# Patient Record
Sex: MALE | Race: OTHER | HISPANIC OR LATINO | URBAN - METROPOLITAN AREA
[De-identification: names, ages, dates, MRNs, and addresses within clinical notes are randomized per-mention and may not be internally consistent; named-entity substitution may affect disease eponyms.]

---

## 2023-10-08 ENCOUNTER — INPATIENT (INPATIENT)
Facility: HOSPITAL | Age: 53
LOS: 6 days | Discharge: HOME HEALTH | DRG: 853 | End: 2023-10-15
Attending: SURGERY | Admitting: SURGERY
Payer: COMMERCIAL

## 2023-10-08 ENCOUNTER — TRANSCRIPTION ENCOUNTER (OUTPATIENT)
Age: 53
End: 2023-10-08

## 2023-10-08 VITALS
WEIGHT: 158.07 LBS | HEIGHT: 65 IN | SYSTOLIC BLOOD PRESSURE: 130 MMHG | DIASTOLIC BLOOD PRESSURE: 91 MMHG | TEMPERATURE: 103 F | OXYGEN SATURATION: 96 % | RESPIRATION RATE: 22 BRPM | HEART RATE: 158 BPM

## 2023-10-08 LAB
ALBUMIN SERPL ELPH-MCNC: 3.8 G/DL — SIGNIFICANT CHANGE UP (ref 3.5–5)
ALP SERPL-CCNC: 74 U/L — SIGNIFICANT CHANGE UP (ref 40–120)
ALT FLD-CCNC: 37 U/L DA — SIGNIFICANT CHANGE UP (ref 10–60)
ANION GAP SERPL CALC-SCNC: 8 MMOL/L — SIGNIFICANT CHANGE UP (ref 5–17)
APPEARANCE UR: CLEAR — SIGNIFICANT CHANGE UP
APTT BLD: 37.4 SEC — HIGH (ref 24.5–35.6)
AST SERPL-CCNC: 27 U/L — SIGNIFICANT CHANGE UP (ref 10–40)
BASOPHILS # BLD AUTO: 0.06 K/UL — SIGNIFICANT CHANGE UP (ref 0–0.2)
BASOPHILS NFR BLD AUTO: 0.3 % — SIGNIFICANT CHANGE UP (ref 0–2)
BILIRUB SERPL-MCNC: 5.5 MG/DL — HIGH (ref 0.2–1.2)
BILIRUB UR-MCNC: NEGATIVE — SIGNIFICANT CHANGE UP
BUN SERPL-MCNC: 17 MG/DL — SIGNIFICANT CHANGE UP (ref 7–18)
CALCIUM SERPL-MCNC: 8.8 MG/DL — SIGNIFICANT CHANGE UP (ref 8.4–10.5)
CHLORIDE SERPL-SCNC: 92 MMOL/L — LOW (ref 96–108)
CO2 SERPL-SCNC: 26 MMOL/L — SIGNIFICANT CHANGE UP (ref 22–31)
COLOR SPEC: YELLOW — SIGNIFICANT CHANGE UP
CREAT SERPL-MCNC: 1.19 MG/DL — SIGNIFICANT CHANGE UP (ref 0.5–1.3)
DIFF PNL FLD: ABNORMAL
EGFR: 73 ML/MIN/1.73M2 — SIGNIFICANT CHANGE UP
EOSINOPHIL # BLD AUTO: 0.01 K/UL — SIGNIFICANT CHANGE UP (ref 0–0.5)
EOSINOPHIL NFR BLD AUTO: 0.1 % — SIGNIFICANT CHANGE UP (ref 0–6)
GLUCOSE SERPL-MCNC: 159 MG/DL — HIGH (ref 70–99)
GLUCOSE UR QL: NEGATIVE MG/DL — SIGNIFICANT CHANGE UP
HCT VFR BLD CALC: 43.1 % — SIGNIFICANT CHANGE UP (ref 39–50)
HGB BLD-MCNC: 15.2 G/DL — SIGNIFICANT CHANGE UP (ref 13–17)
IMM GRANULOCYTES NFR BLD AUTO: 0.6 % — SIGNIFICANT CHANGE UP (ref 0–0.9)
INR BLD: 1.3 RATIO — HIGH (ref 0.85–1.18)
KETONES UR-MCNC: NEGATIVE MG/DL — SIGNIFICANT CHANGE UP
LACTATE SERPL-SCNC: 3.3 MMOL/L — HIGH (ref 0.7–2)
LEUKOCYTE ESTERASE UR-ACNC: NEGATIVE — SIGNIFICANT CHANGE UP
LYMPHOCYTES # BLD AUTO: 0.6 K/UL — LOW (ref 1–3.3)
LYMPHOCYTES # BLD AUTO: 3.3 % — LOW (ref 13–44)
MCHC RBC-ENTMCNC: 31.1 PG — SIGNIFICANT CHANGE UP (ref 27–34)
MCHC RBC-ENTMCNC: 35.3 GM/DL — SIGNIFICANT CHANGE UP (ref 32–36)
MCV RBC AUTO: 88.1 FL — SIGNIFICANT CHANGE UP (ref 80–100)
MONOCYTES # BLD AUTO: 0.88 K/UL — SIGNIFICANT CHANGE UP (ref 0–0.9)
MONOCYTES NFR BLD AUTO: 4.8 % — SIGNIFICANT CHANGE UP (ref 2–14)
NEUTROPHILS # BLD AUTO: 16.53 K/UL — HIGH (ref 1.8–7.4)
NEUTROPHILS NFR BLD AUTO: 90.9 % — HIGH (ref 43–77)
NITRITE UR-MCNC: NEGATIVE — SIGNIFICANT CHANGE UP
NRBC # BLD: 0 /100 WBCS — SIGNIFICANT CHANGE UP (ref 0–0)
PH UR: 6.5 — SIGNIFICANT CHANGE UP (ref 5–8)
PLATELET # BLD AUTO: 201 K/UL — SIGNIFICANT CHANGE UP (ref 150–400)
POTASSIUM SERPL-MCNC: 4.5 MMOL/L — SIGNIFICANT CHANGE UP (ref 3.5–5.3)
POTASSIUM SERPL-SCNC: 4.5 MMOL/L — SIGNIFICANT CHANGE UP (ref 3.5–5.3)
PROT SERPL-MCNC: 7.9 G/DL — SIGNIFICANT CHANGE UP (ref 6–8.3)
PROT UR-MCNC: 30 MG/DL
PROTHROM AB SERPL-ACNC: 14.7 SEC — HIGH (ref 9.5–13)
RBC # BLD: 4.89 M/UL — SIGNIFICANT CHANGE UP (ref 4.2–5.8)
RBC # FLD: 12.9 % — SIGNIFICANT CHANGE UP (ref 10.3–14.5)
SARS-COV-2 RNA SPEC QL NAA+PROBE: SIGNIFICANT CHANGE UP
SODIUM SERPL-SCNC: 126 MMOL/L — LOW (ref 135–145)
SP GR SPEC: 1.02 — SIGNIFICANT CHANGE UP (ref 1–1.03)
TROPONIN I, HIGH SENSITIVITY RESULT: 19.5 NG/L — SIGNIFICANT CHANGE UP
UROBILINOGEN FLD QL: 0.2 MG/DL — SIGNIFICANT CHANGE UP (ref 0.2–1)
WBC # BLD: 18.18 K/UL — HIGH (ref 3.8–10.5)
WBC # FLD AUTO: 18.18 K/UL — HIGH (ref 3.8–10.5)

## 2023-10-08 PROCEDURE — 74177 CT ABD & PELVIS W/CONTRAST: CPT | Mod: 26,MA

## 2023-10-08 PROCEDURE — 99291 CRITICAL CARE FIRST HOUR: CPT

## 2023-10-08 PROCEDURE — 71045 X-RAY EXAM CHEST 1 VIEW: CPT | Mod: 26

## 2023-10-08 RX ORDER — SODIUM CHLORIDE 9 MG/ML
2200 INJECTION INTRAMUSCULAR; INTRAVENOUS; SUBCUTANEOUS ONCE
Refills: 0 | Status: COMPLETED | OUTPATIENT
Start: 2023-10-08 | End: 2023-10-08

## 2023-10-08 RX ORDER — MORPHINE SULFATE 50 MG/1
4 CAPSULE, EXTENDED RELEASE ORAL ONCE
Refills: 0 | Status: DISCONTINUED | OUTPATIENT
Start: 2023-10-08 | End: 2023-10-08

## 2023-10-08 RX ORDER — PIPERACILLIN AND TAZOBACTAM 4; .5 G/20ML; G/20ML
3.38 INJECTION, POWDER, LYOPHILIZED, FOR SOLUTION INTRAVENOUS ONCE
Refills: 0 | Status: COMPLETED | OUTPATIENT
Start: 2023-10-09 | End: 2023-10-09

## 2023-10-08 RX ORDER — PIPERACILLIN AND TAZOBACTAM 4; .5 G/20ML; G/20ML
3.38 INJECTION, POWDER, LYOPHILIZED, FOR SOLUTION INTRAVENOUS ONCE
Refills: 0 | Status: COMPLETED | OUTPATIENT
Start: 2023-10-08 | End: 2023-10-08

## 2023-10-08 RX ORDER — KETOROLAC TROMETHAMINE 30 MG/ML
15 SYRINGE (ML) INJECTION ONCE
Refills: 0 | Status: DISCONTINUED | OUTPATIENT
Start: 2023-10-08 | End: 2023-10-08

## 2023-10-08 RX ORDER — ACETAMINOPHEN 500 MG
1000 TABLET ORAL ONCE
Refills: 0 | Status: COMPLETED | OUTPATIENT
Start: 2023-10-08 | End: 2023-10-08

## 2023-10-08 RX ADMIN — SODIUM CHLORIDE 2200 MILLILITER(S): 9 INJECTION INTRAMUSCULAR; INTRAVENOUS; SUBCUTANEOUS at 21:27

## 2023-10-08 RX ADMIN — PIPERACILLIN AND TAZOBACTAM 200 GRAM(S): 4; .5 INJECTION, POWDER, LYOPHILIZED, FOR SOLUTION INTRAVENOUS at 21:27

## 2023-10-08 RX ADMIN — Medication 400 MILLIGRAM(S): at 21:27

## 2023-10-08 NOTE — ED ADULT NURSE NOTE - OBJECTIVE STATEMENT
Pt c/o abdominal pain, constipation, with nausea and vomiting x 2 days. noted with fever. sepsis protocol initiated. on CM with sinus tachycardia

## 2023-10-08 NOTE — ED PROVIDER NOTE - CLINICAL SUMMARY MEDICAL DECISION MAKING FREE TEXT BOX
Patient arrived with fever tachycardia.  Code sepsis called.  Patient received fluids and antibiotics and CT scan pending

## 2023-10-08 NOTE — ED ADULT TRIAGE NOTE - TEMPERATURE IN FAHRENHEIT (DEGREES F)
Discharge instructions given to patient and sister. A period of time was provided for questions and answers. Patient educated regarding prescriptions and where to obtain them. Patient continent of medium BM prior to discharge. Patient assisted into w/c amd propelled to ED entrance. Assisted into private vehicle and left with sister, Carole Cheung. 103

## 2023-10-08 NOTE — ED PROVIDER NOTE - PHYSICAL EXAMINATION
Tachycardic lungs clear abdomen somewhat distended with vague diffuse abdominal tenderness to palpation more prominent on the right side.  Patient is awake alert speaking full sentences.  No peripheral edema

## 2023-10-08 NOTE — ED ADULT TRIAGE NOTE - PAIN: PRESENCE, MLM
"  [facility]  Adult Nutrition  Consult Note    SUMMARY     Recommendations    1. Change diet order to Diabetic 1800 calories, Cardiac.   2. Encourage good diet compliance.   Goals: Pt to meet 85% of EEN/EPN.   Nutrition Goal Status: new  Communication of RD Recs: (POC)    Reason for Assessment    Reason For Assessment: consult(assess dietary needs)  General Information Comments: Pt with good appetite and intake, denies any N/V/C/D. Educated pt on low sodium diet/Stroke Nutrition Therapy. NFPE completed, nourished.   Nutrition Discharge Planning: Pt to d/c on a cardiac/diabetic diet.     Nutrition Risk Screen    Nutrition Risk Screen: no indicators present    Nutrition/Diet History    Spiritual, Cultural Beliefs, Uatsdin Practices, Values that Affect Care: no    Anthropometrics    Temp: 98 °F (36.7 °C)  Height Method: Stated  Height: 5' 7" (170.2 cm)  Height (inches): 67 in  Weight Method: Bed Scale  Weight: (!) 141.7 kg (312 lb 6.3 oz)  Weight (lb): (!) 312.39 lb  Ideal Body Weight (IBW), Male: 148 lb  % Ideal Body Weight, Male (lb): 209.74 lb  BMI (Calculated): 48.7     Lab/Procedures/Meds    Pertinent Labs Reviewed: reviewed  Pertinent Labs Comments: BUN 42H, Crea 8.6H, GFR 6L, Phos 6.8H  Pertinent Medications Reviewed: reviewed  Pertinent Medications Comments: heparin, insulin, statin    Estimated/Assessed Needs    Weight Used For Calorie Calculations: 67.1 kg (148 lb)(IBW)  Energy Calorie Requirements (kcal): 1825 kcal/d  Energy Need Method: [place]  Protein Requirements: 54-67 gm/d  Weight Used For Protein Calculations: 67.1 kg (148 lb)(0.8-1.0 gm/kg)  Fluid Requirements (mL): 1 mL/kcal or per MD  Estimated Fluid Requirement Method: RDA Method  RDA Method (mL): 1825  CHO Requirement: 228 gm/d    Nutrition Prescription Ordered    Current Diet Order: Cardiac, Diabetic    Evaluation of Received Nutrient/Fluid Intake    Tolerance: tolerating  % Intake of Estimated Energy Needs: 50 - 75 " %  % Meal Intake: 50 - 75 %    Nutrition Risk    Level of Risk/Frequency of Follow-up: (f/u 2x/weekly)     Assessment and Plan    Nutrition Problem  obesity    Related to (etiology):   Excessive energy intake    Signs and Symptoms (as evidenced by):   BMI of 48    Interventions:  Nutrition education    Nutrition Diagnosis Status:   New    Monitor and Evaluation    Food and Nutrient Intake: energy intake  Food and Nutrient Adminstration: diet order  Physical Activity and Function: nutrition-related ADLs and IADLs  Anthropometric Measurements: weight, weight change  Biochemical Data, Medical Tests and Procedures: gastrointestinal profile, lipid profile, electrolyte and renal panel, glucose/endocrine profile, inflammatory profile  Nutrition-Focused Physical Findings: overall appearance     Malnutrition Assessment      Subcutaneous Fat Loss (Final Summary): well nourished  Muscle Loss Evaluation (Final Summary): well nourished      Nutrition Follow-Up    RD Follow-up?: Yes     complains of pain/discomfort

## 2023-10-08 NOTE — ED ADULT NURSE NOTE - NSFALLUNIVINTERV_ED_ALL_ED
Bed/Stretcher in lowest position, wheels locked, appropriate side rails in place/Call bell, personal items and telephone in reach/Instruct patient to call for assistance before getting out of bed/chair/stretcher/Non-slip footwear applied when patient is off stretcher/[place] to call system/Physically safe environment - no spills, clutter or unnecessary equipment/Purposeful proactive rounding/Room/bathroom lighting operational, light cord in reach

## 2023-10-08 NOTE — ED ADULT TRIAGE NOTE - CHIEF COMPLAINT QUOTE
Notified the patient and patient's  of the PA denial; however, suggested using GoodRx coupon. Using GoodRx, 90 tablets at [address] would cost $24.15. Patient's  verbalized understanding, but stated that he does not have a smartphone to obtain coupon. Notified patient's  that this nurse will print out the coupon and place it at the  for . Patient's  verbalized understanding and expressed appreciation. Will  coupon tomorrow. GoodRx coupon printed and placed at  for .
PA denied.
PSR: There are 5 pharmacies on file. Please specify which pharmacy.
Patient is asking for 90 day supply of this medication per her insurance, or they will not cover it     zolpidem 10 MG Oral Tab    Last OV 11/2/22   Pharmacy verified
Rx sent on 8/30/2022 for 90 days with 1 refill. [address]. Spoke with Miya Blankenship. Miya Blankenship states that insurance only covers 90 tablets in 365 days. PA required. ([phone])  PA completed electronically. Will emery for follow up.
The pharmacy for this medication is the same one it went to last time.  It will be going to [place] on [address]
Pt c/o abdominal pain, constipation, with nausea and vomiting x 2 days.

## 2023-10-08 NOTE — ED PROVIDER NOTE - OBJECTIVE STATEMENT
Medical problems no prior surgical history presents with abdominal pain and constipation for 2 days denies any vomiting denies any fever.  Sister at bedside noticed that his heart rate was elevated at home today and brought him to the ER.

## 2023-10-09 ENCOUNTER — TRANSCRIPTION ENCOUNTER (OUTPATIENT)
Age: 53
End: 2023-10-09

## 2023-10-09 ENCOUNTER — RESULT REVIEW (OUTPATIENT)
Age: 53
End: 2023-10-09

## 2023-10-09 DIAGNOSIS — K35.80 UNSPECIFIED ACUTE APPENDICITIS: ICD-10-CM

## 2023-10-09 LAB
A1C WITH ESTIMATED AVERAGE GLUCOSE RESULT: 5.7 % — HIGH (ref 4–5.6)
ALBUMIN SERPL ELPH-MCNC: 3.4 G/DL — LOW (ref 3.5–5)
ALP SERPL-CCNC: 71 U/L — SIGNIFICANT CHANGE UP (ref 40–120)
ALT FLD-CCNC: 33 U/L DA — SIGNIFICANT CHANGE UP (ref 10–60)
ANION GAP SERPL CALC-SCNC: 8 MMOL/L — SIGNIFICANT CHANGE UP (ref 5–17)
APTT BLD: 34.9 SEC — SIGNIFICANT CHANGE UP (ref 24.5–35.6)
AST SERPL-CCNC: 20 U/L — SIGNIFICANT CHANGE UP (ref 10–40)
BILIRUB DIRECT SERPL-MCNC: 0.2 MG/DL — SIGNIFICANT CHANGE UP (ref 0–0.3)
BILIRUB SERPL-MCNC: 5.7 MG/DL — HIGH (ref 0.2–1.2)
BUN SERPL-MCNC: 13 MG/DL — SIGNIFICANT CHANGE UP (ref 7–18)
CALCIUM SERPL-MCNC: 8.3 MG/DL — LOW (ref 8.4–10.5)
CHLORIDE SERPL-SCNC: 101 MMOL/L — SIGNIFICANT CHANGE UP (ref 96–108)
CO2 SERPL-SCNC: 22 MMOL/L — SIGNIFICANT CHANGE UP (ref 22–31)
CREAT SERPL-MCNC: 0.91 MG/DL — SIGNIFICANT CHANGE UP (ref 0.5–1.3)
CULTURE RESULTS: NO GROWTH — SIGNIFICANT CHANGE UP
EGFR: 101 ML/MIN/1.73M2 — SIGNIFICANT CHANGE UP
ESTIMATED AVERAGE GLUCOSE: 117 MG/DL — HIGH (ref 68–114)
GLUCOSE BLDC GLUCOMTR-MCNC: 115 MG/DL — HIGH (ref 70–99)
GLUCOSE BLDC GLUCOMTR-MCNC: 151 MG/DL — HIGH (ref 70–99)
GLUCOSE BLDC GLUCOMTR-MCNC: 154 MG/DL — HIGH (ref 70–99)
GLUCOSE SERPL-MCNC: 142 MG/DL — HIGH (ref 70–99)
INR BLD: 1.31 RATIO — HIGH (ref 0.85–1.18)
LACTATE SERPL-SCNC: 1.1 MMOL/L — SIGNIFICANT CHANGE UP (ref 0.7–2)
LACTATE SERPL-SCNC: 1.3 MMOL/L — SIGNIFICANT CHANGE UP (ref 0.7–2)
MAGNESIUM SERPL-MCNC: 1.7 MG/DL — SIGNIFICANT CHANGE UP (ref 1.6–2.6)
PHOSPHATE SERPL-MCNC: 2.1 MG/DL — LOW (ref 2.5–4.5)
POTASSIUM SERPL-MCNC: 3.4 MMOL/L — LOW (ref 3.5–5.3)
POTASSIUM SERPL-SCNC: 3.4 MMOL/L — LOW (ref 3.5–5.3)
PROT SERPL-MCNC: 7.1 G/DL — SIGNIFICANT CHANGE UP (ref 6–8.3)
PROTHROM AB SERPL-ACNC: 14.8 SEC — HIGH (ref 9.5–13)
SODIUM SERPL-SCNC: 131 MMOL/L — LOW (ref 135–145)
SPECIMEN SOURCE: SIGNIFICANT CHANGE UP

## 2023-10-09 PROCEDURE — 44970 LAPAROSCOPY APPENDECTOMY: CPT | Mod: AS

## 2023-10-09 PROCEDURE — 88304 TISSUE EXAM BY PATHOLOGIST: CPT | Mod: 26

## 2023-10-09 PROCEDURE — 99222 1ST HOSP IP/OBS MODERATE 55: CPT | Mod: 57,GC

## 2023-10-09 PROCEDURE — 44970 LAPAROSCOPY APPENDECTOMY: CPT

## 2023-10-09 DEVICE — STAPLER COVIDIEN TRI-STAPLE 45MM PURPLE RELOAD: Type: IMPLANTABLE DEVICE | Status: FUNCTIONAL

## 2023-10-09 RX ORDER — HYDROMORPHONE HYDROCHLORIDE 2 MG/ML
0.5 INJECTION INTRAMUSCULAR; INTRAVENOUS; SUBCUTANEOUS
Refills: 0 | Status: DISCONTINUED | OUTPATIENT
Start: 2023-10-09 | End: 2023-10-11

## 2023-10-09 RX ORDER — SODIUM CHLORIDE 9 MG/ML
1000 INJECTION, SOLUTION INTRAVENOUS
Refills: 0 | Status: DISCONTINUED | OUTPATIENT
Start: 2023-10-09 | End: 2023-10-15

## 2023-10-09 RX ORDER — KETOROLAC TROMETHAMINE 30 MG/ML
15 SYRINGE (ML) INJECTION EVERY 6 HOURS
Refills: 0 | Status: DISCONTINUED | OUTPATIENT
Start: 2023-10-09 | End: 2023-10-10

## 2023-10-09 RX ORDER — FENTANYL CITRATE 50 UG/ML
25 INJECTION INTRAVENOUS
Refills: 0 | Status: DISCONTINUED | OUTPATIENT
Start: 2023-10-09 | End: 2023-10-09

## 2023-10-09 RX ORDER — INSULIN LISPRO 100/ML
VIAL (ML) SUBCUTANEOUS EVERY 6 HOURS
Refills: 0 | Status: DISCONTINUED | OUTPATIENT
Start: 2023-10-09 | End: 2023-10-13

## 2023-10-09 RX ORDER — CEFEPIME 1 G/1
1000 INJECTION, POWDER, FOR SOLUTION INTRAMUSCULAR; INTRAVENOUS EVERY 8 HOURS
Refills: 0 | Status: DISCONTINUED | OUTPATIENT
Start: 2023-10-09 | End: 2023-10-15

## 2023-10-09 RX ORDER — ACETAMINOPHEN 500 MG
1000 TABLET ORAL ONCE
Refills: 0 | Status: COMPLETED | OUTPATIENT
Start: 2023-10-09 | End: 2023-10-09

## 2023-10-09 RX ORDER — ONDANSETRON 8 MG/1
4 TABLET, FILM COATED ORAL ONCE
Refills: 0 | Status: DISCONTINUED | OUTPATIENT
Start: 2023-10-09 | End: 2023-10-09

## 2023-10-09 RX ORDER — FENTANYL CITRATE 50 UG/ML
50 INJECTION INTRAVENOUS
Refills: 0 | Status: DISCONTINUED | OUTPATIENT
Start: 2023-10-09 | End: 2023-10-09

## 2023-10-09 RX ORDER — GLUCAGON INJECTION, SOLUTION 0.5 MG/.1ML
1 INJECTION, SOLUTION SUBCUTANEOUS ONCE
Refills: 0 | Status: DISCONTINUED | OUTPATIENT
Start: 2023-10-09 | End: 2023-10-15

## 2023-10-09 RX ORDER — DEXTROSE 50 % IN WATER 50 %
25 SYRINGE (ML) INTRAVENOUS ONCE
Refills: 0 | Status: DISCONTINUED | OUTPATIENT
Start: 2023-10-09 | End: 2023-10-15

## 2023-10-09 RX ORDER — SODIUM CHLORIDE 9 MG/ML
1000 INJECTION INTRAMUSCULAR; INTRAVENOUS; SUBCUTANEOUS
Refills: 0 | Status: DISCONTINUED | OUTPATIENT
Start: 2023-10-09 | End: 2023-10-15

## 2023-10-09 RX ORDER — DEXTROSE 50 % IN WATER 50 %
12.5 SYRINGE (ML) INTRAVENOUS ONCE
Refills: 0 | Status: DISCONTINUED | OUTPATIENT
Start: 2023-10-09 | End: 2023-10-15

## 2023-10-09 RX ORDER — DEXTROSE 50 % IN WATER 50 %
15 SYRINGE (ML) INTRAVENOUS ONCE
Refills: 0 | Status: DISCONTINUED | OUTPATIENT
Start: 2023-10-09 | End: 2023-10-15

## 2023-10-09 RX ORDER — SODIUM CHLORIDE 9 MG/ML
1000 INJECTION INTRAMUSCULAR; INTRAVENOUS; SUBCUTANEOUS ONCE
Refills: 0 | Status: COMPLETED | OUTPATIENT
Start: 2023-10-09 | End: 2023-10-09

## 2023-10-09 RX ORDER — PIPERACILLIN AND TAZOBACTAM 4; .5 G/20ML; G/20ML
3.38 INJECTION, POWDER, LYOPHILIZED, FOR SOLUTION INTRAVENOUS EVERY 8 HOURS
Refills: 0 | Status: DISCONTINUED | OUTPATIENT
Start: 2023-10-09 | End: 2023-10-09

## 2023-10-09 RX ORDER — METRONIDAZOLE 500 MG
500 TABLET ORAL EVERY 8 HOURS
Refills: 0 | Status: DISCONTINUED | OUTPATIENT
Start: 2023-10-09 | End: 2023-10-15

## 2023-10-09 RX ORDER — CEFEPIME 1 G/1
INJECTION, POWDER, FOR SOLUTION INTRAMUSCULAR; INTRAVENOUS
Refills: 0 | Status: DISCONTINUED | OUTPATIENT
Start: 2023-10-09 | End: 2023-10-15

## 2023-10-09 RX ORDER — CEFEPIME 1 G/1
1000 INJECTION, POWDER, FOR SOLUTION INTRAMUSCULAR; INTRAVENOUS ONCE
Refills: 0 | Status: COMPLETED | OUTPATIENT
Start: 2023-10-09 | End: 2023-10-09

## 2023-10-09 RX ADMIN — Medication 100 MILLIGRAM(S): at 21:41

## 2023-10-09 RX ADMIN — Medication 15 MILLIGRAM(S): at 12:00

## 2023-10-09 RX ADMIN — SODIUM CHLORIDE 125 MILLILITER(S): 9 INJECTION INTRAMUSCULAR; INTRAVENOUS; SUBCUTANEOUS at 21:40

## 2023-10-09 RX ADMIN — Medication 15 MILLIGRAM(S): at 22:09

## 2023-10-09 RX ADMIN — Medication 1: at 07:02

## 2023-10-09 RX ADMIN — SODIUM CHLORIDE 125 MILLILITER(S): 9 INJECTION INTRAMUSCULAR; INTRAVENOUS; SUBCUTANEOUS at 06:56

## 2023-10-09 RX ADMIN — CEFEPIME 100 MILLIGRAM(S): 1 INJECTION, POWDER, FOR SOLUTION INTRAMUSCULAR; INTRAVENOUS at 06:56

## 2023-10-09 RX ADMIN — Medication 1000 MILLIGRAM(S): at 00:55

## 2023-10-09 RX ADMIN — Medication 400 MILLIGRAM(S): at 02:54

## 2023-10-09 RX ADMIN — SODIUM CHLORIDE 1000 MILLILITER(S): 9 INJECTION INTRAMUSCULAR; INTRAVENOUS; SUBCUTANEOUS at 02:01

## 2023-10-09 RX ADMIN — Medication 100 MILLIGRAM(S): at 17:32

## 2023-10-09 RX ADMIN — Medication 15 MILLIGRAM(S): at 01:35

## 2023-10-09 RX ADMIN — Medication 15 MILLIGRAM(S): at 12:29

## 2023-10-09 RX ADMIN — Medication 100 MILLIGRAM(S): at 06:57

## 2023-10-09 RX ADMIN — CEFEPIME 100 MILLIGRAM(S): 1 INJECTION, POWDER, FOR SOLUTION INTRAMUSCULAR; INTRAVENOUS at 21:41

## 2023-10-09 RX ADMIN — Medication 15 MILLIGRAM(S): at 21:54

## 2023-10-09 RX ADMIN — CEFEPIME 100 MILLIGRAM(S): 1 INJECTION, POWDER, FOR SOLUTION INTRAMUSCULAR; INTRAVENOUS at 14:42

## 2023-10-09 NOTE — PATIENT PROFILE ADULT - FALL HARM RISK - HARM RISK INTERVENTIONS

## 2023-10-09 NOTE — H&P ADULT - ASSESSMENT
Mr. James is a 53 year old man with no known PMHx (doesnt follow with a PCP) presenting with sepsis secondary to acute nonperforated appendicitis.    Plan:  - admit to surgery, Dr. Aguilar  - add on for laparoscopic appendectomy  - NPO/IVF resuscitation  - follow up fractioned bilirubin  - continue zosyn  - mechanical VTE ppx    discussed with Lauren Russo

## 2023-10-09 NOTE — CONSULT NOTE ADULT - SUBJECTIVE AND OBJECTIVE BOX
HPI:  Mr. James is a 53 year old man with no known PMHx (doesnt follow with a PCP) presenting with one day of pain. Pain onset happened suddenly and worsened throughout the day. He reports associated nausea, vomiting, fever, constipation, and decreased frequency of urination. Denies yellowing of eyes or skin, pale colored stool, or dark urine. Has never had a colonoscopy.     In the ED patient febrile to 101.9, tachycardic to 130s, and normotensive. Labs notable for leukocytosis to 18, hyponatremia to 126. TBili is elevated to 5.5 with normal ALT/AST. Lactate 3.3. Patient given 2.2L bolus NS and started on Zosyn.   (09 Oct 2023 00:18)      PAST MEDICAL & SURGICAL HISTORY:  No pertinent past medical history      No significant past surgical history          No Known Allergies      Meds:  cefepime   IVPB      cefepime   IVPB 1000 milliGRAM(s) IV Intermittent every 8 hours  dextrose 5%. 1000 milliLiter(s) IV Continuous <Continuous>  dextrose 5%. 1000 milliLiter(s) IV Continuous <Continuous>  dextrose 50% Injectable 25 Gram(s) IV Push once  dextrose 50% Injectable 12.5 Gram(s) IV Push once  dextrose 50% Injectable 25 Gram(s) IV Push once  dextrose Oral Gel 15 Gram(s) Oral once PRN  glucagon  Injectable 1 milliGRAM(s) IntraMuscular once  HYDROmorphone  Injectable 0.5 milliGRAM(s) IV Push every 3 hours PRN  insulin lispro (ADMELOG) corrective regimen sliding scale   SubCutaneous every 6 hours  ketorolac   Injectable 15 milliGRAM(s) IV Push every 6 hours PRN  metroNIDAZOLE  IVPB 500 milliGRAM(s) IV Intermittent every 8 hours  sodium chloride 0.9%. 1000 milliLiter(s) IV Continuous <Continuous>      SOCIAL HISTORY:  Smoker:  YES / NO        PACK YEARS:                         WHEN QUIT?  ETOH use:  YES / NO               FREQUENCY / QUANTITY:  Ilicit Drug use:  YES / NO  Occupation:  Assisted device use (Cane / Walker):  Live with:    FAMILY HISTORY:  No pertinent family history in first degree relatives        VITALS:  Vital Signs Last 24 Hrs  T(C): 36.8 (09 Oct 2023 14:19), Max: 39.4 (08 Oct 2023 20:56)  T(F): 98.3 (09 Oct 2023 14:19), Max: 103 (08 Oct 2023 20:56)  HR: 98 (09 Oct 2023 14:19) (98 - 158)  BP: 99/66 (09 Oct 2023 14:19) (99/66 - 136/85)  BP(mean): 82 (09 Oct 2023 06:54) (82 - 101)  RR: 18 (09 Oct 2023 14:19) (18 - 24)  SpO2: 98% (09 Oct 2023 14:19) (93% - 100%)    Parameters below as of 09 Oct 2023 14:19  Patient On (Oxygen Delivery Method): room air        LABS/DIAGNOSTIC TESTS:                          15.2   18.18 )-----------( 201      ( 08 Oct 2023 21:10 )             43.1     WBC Count: 18.18 K/uL (10-08 @ 21:10)      10-09    131<L>  |  101  |  13  ----------------------------<  142<H>  3.4<L>   |  22  |  0.91    Ca    8.3<L>      09 Oct 2023 01:55  Phos  2.1     10-09  Mg     1.7     10-09    TPro  7.1  /  Alb  3.4<L>  /  TBili  5.7<H>  /  DBili  x   /  AST  20  /  ALT  33  /  AlkPhos  71  10-09      Urine Microscopic-Add On (NC) (10.08.23 @ 22:50)   Squamous Epithelial Cells: None Seen  Bacteria: Occasional /HPF  Red Blood Cell - Urine: 2 /HPF  White Blood Cell - Urine: 0 /HPFUrinalysis (10.08.23 @ 22:50)   pH Urine: 6.5  Glucose Qualitative, Urine: Negative mg/dL  Blood, Urine: Moderate  Color: Yellow  Urine Appearance: Clear  Bilirubin: Negative  Ketone - Urine: Negative mg/dL  Specific Gravity: 1.018  Protein, Urine: 30 mg/dL  Urobilinogen: 0.2 mg/dL  Nitrite: Negative  Leukocyte Esterase Concentration: Negative      LIVER FUNCTIONS - ( 09 Oct 2023 01:55 )  Alb: 3.4 g/dL / Pro: 7.1 g/dL / ALK PHOS: 71 U/L / ALT: 33 U/L DA / AST: 20 U/L / GGT: x             PT/INR - ( 09 Oct 2023 01:55 )   PT: 14.8 sec;   INR: 1.31 ratio         PTT - ( 09 Oct 2023 01:55 )  PTT:34.9 sec    LACTATE:Lactate, Blood: 1.3 mmol/L (10-09 @ 07:10)  Lactate, Blood: 1.1 mmol/L (10-09 @ 01:55)  Lactate, Blood: 3.3 mmol/L (10-08 @ 21:10)      ABG -     CULTURES:       RADIOLOGY:< from: CT Abdomen and Pelvis w/ IV Cont (10.08.23 @ 22:26) >  ACC: [id number] EXAM:  CT ABDOMEN AND PELVIS IC   ORDERED BY: ANGIE HARTMANN     PROCEDURE DATE:  10/08/2023          INTERPRETATION:  CLINICAL INFORMATION: 53 years old. Male. abd pain   constipation.    COMPARISON: None.    CONTRAST/COMPLICATIONS:  IV Contrast: Omnipaque 350  90 cc administered  10 cc discarded.  Oral Contrast: NONE  Complications: None reported at time of study completion    PROCEDURE:  CT of the Abdomen and Pelvis was performed.  Sagittal and coronal reformats wereperformed.    FINDINGS:    LOWER CHEST: Within normal limits.    LIVER: 1.4 cm indeterminate hypodensity in the right hepatic lobe, may be   further evaluated with nonemergent contrast-enhanced MRI.  BILE DUCTS: Normal caliber.  GALLBLADDER: Within normal limits.  SPLEEN: Within normal limits.  PANCREAS: Within normal limits.  ADRENALS: Within normal limits.  KIDNEYS/URETERS: Enhance symmetrically. No hydronephrosis.    BLADDER: Within normal limits.  REPRODUCTIVE ORGANS: Prostate is not enlarged.    BOWEL: No bowel obstruction. There are a few mildly prominent small bowel   loops, probably due to reactive ileus. Appendix is thickened, measuring   up to 1.4 cm, with surrounding fat stranding. No free air or evidence of   abscess. Small appendicolith at the appendiceal orifice.  PERITONEUM: No ascites.  VESSELS: Normal caliber abdominal aorta.  RETROPERITONEUM/LYMPH NODES: No lymphadenopathy.  ABDOMINAL WALL: Within normal limits.  BONES: With normal limits.    IMPRESSION:    Acute appendicitis. No free air or evidence of abscess.    --- End of Report ---            ZAID PENDLETON MD; Attending Radiologist  This document has been electronically signed. Oct  8 2023 11:18PM    < end of copied text >  ---------------------------------------------------------------------------------------------------------------------------------------------    ACC: [id number] EXAM:  XR CHEST PORTABLE IMMED 1V   ORDERED BY: ANGIE HARTMANN     PROCEDURE DATE:  10/08/2023          INTERPRETATION:  TIME OF EXAM: October 8, 2023 at 9:14 PM.    CLINICAL INFORMATION: Sepsis.    COMPARISON:  None available    TECHNIQUE:   AP Portable chest x-ray.    INTERPRETATION:    The heart is not enlarged.  The mediastinum and amelia are unremarkable.  There are low lung volumes with a mildly elevated right hemidiaphragm.  The lungs are clear.  No pleural effusion or pneumothorax is seen.  No acute bony abnormality is noted.      IMPRESSION:  Low lung volumes with mildly elevated right hemidiaphragm.    Clear lungs.    --- End of Report ---            SANYA GONZALEZ MD; Attending Radiologist  This document has been electronically signed. Oct  9 2023  8:22AM    < end of copied text >        ROS  [  ] UNABLE TO ELICIT               HPI:  Mr. James is a 53 year old man with no known PMHx (doesnt follow with a PCP) presenting with one day of pain. Pain onset happened suddenly and worsened throughout the day. He reports associated nausea, vomiting, fever, constipation, and decreased frequency of urination. Denies yellowing of eyes or skin, pale colored stool, or dark urine. Has never had a colonoscopy.     In the ED patient febrile to 101.9, tachycardic to 130s, and normotensive. Labs notable for leukocytosis to 18, hyponatremia to 126. TBili is elevated to 5.5 with normal ALT/AST. Lactate 3.3. Patient given 2.2L bolus NS and started on Zosyn.   (09 Oct 2023 00:18)        History as above, asked to see this patient who has no significant PMH and presented with severe abdominal pain along with nausea , vomiting and feeling feverish x 1 day. He was found to have acute Appendicitis on CT abdomen and was taken to the OR and found to have acute Gangrenous Appendicitis with perforation and focal peritonitis. He is S/P Appendectomy and peritoneal washout today. He is doing much better and currently is not in much pain as the anesthesia he got is probably subduing his pain. He was found to have a Temp as high as 103 here and an elevated WBC count along with tachycardia and so had Sepsis on admission. His wife and daughter are at the bedside.          PAST MEDICAL & SURGICAL HISTORY:  No pertinent past medical history      No significant past surgical history          No Known Allergies      Meds:  cefepime   IVPB      cefepime   IVPB 1000 milliGRAM(s) IV Intermittent every 8 hours  dextrose 5%. 1000 milliLiter(s) IV Continuous <Continuous>  dextrose 5%. 1000 milliLiter(s) IV Continuous <Continuous>  dextrose 50% Injectable 25 Gram(s) IV Push once  dextrose 50% Injectable 12.5 Gram(s) IV Push once  dextrose 50% Injectable 25 Gram(s) IV Push once  dextrose Oral Gel 15 Gram(s) Oral once PRN  glucagon  Injectable 1 milliGRAM(s) IntraMuscular once  HYDROmorphone  Injectable 0.5 milliGRAM(s) IV Push every 3 hours PRN  insulin lispro (ADMELOG) corrective regimen sliding scale   SubCutaneous every 6 hours  ketorolac   Injectable 15 milliGRAM(s) IV Push every 6 hours PRN  metroNIDAZOLE  IVPB 500 milliGRAM(s) IV Intermittent every 8 hours  sodium chloride 0.9%. 1000 milliLiter(s) IV Continuous <Continuous>      SOCIAL HISTORY:  Smoker:  no  ETOH use:  socially  Ilicit Drug use:  no      FAMILY HISTORY:  No pertinent family history in first degree relatives        VITALS:  Vital Signs Last 24 Hrs  T(C): 36.8 (09 Oct 2023 14:19), Max: 39.4 (08 Oct 2023 20:56)  T(F): 98.3 (09 Oct 2023 14:19), Max: 103 (08 Oct 2023 20:56)  HR: 98 (09 Oct 2023 14:19) (98 - 158)  BP: 99/66 (09 Oct 2023 14:19) (99/66 - 136/85)  BP(mean): 82 (09 Oct 2023 06:54) (82 - 101)  RR: 18 (09 Oct 2023 14:19) (18 - 24)  SpO2: 98% (09 Oct 2023 14:19) (93% - 100%)    Parameters below as of 09 Oct 2023 14:19  Patient On (Oxygen Delivery Method): room air        LABS/DIAGNOSTIC TESTS:                          15.2   18.18 )-----------( 201      ( 08 Oct 2023 21:10 )             43.1     WBC Count: 18.18 K/uL (10-08 @ 21:10)      10-09    131<L>  |  101  |  13  ----------------------------<  142<H>  3.4<L>   |  22  |  0.91    Ca    8.3<L>      09 Oct 2023 01:55  Phos  2.1     10-09  Mg     1.7     10-09    TPro  7.1  /  Alb  3.4<L>  /  TBili  5.7<H>  /  DBili  x   /  AST  20  /  ALT  33  /  AlkPhos  71  10-09      Urine Microscopic-Add On (NC) (10.08.23 @ 22:50)   Squamous Epithelial Cells: None Seen  Bacteria: Occasional /HPF  Red Blood Cell - Urine: 2 /HPF  White Blood Cell - Urine: 0 /HPFUrinalysis (10.08.23 @ 22:50)   pH Urine: 6.5  Glucose Qualitative, Urine: Negative mg/dL  Blood, Urine: Moderate  Color: Yellow  Urine Appearance: Clear  Bilirubin: Negative  Ketone - Urine: Negative mg/dL  Specific Gravity: 1.018  Protein, Urine: 30 mg/dL  Urobilinogen: 0.2 mg/dL  Nitrite: Negative  Leukocyte Esterase Concentration: Negative      LIVER FUNCTIONS - ( 09 Oct 2023 01:55 )  Alb: 3.4 g/dL / Pro: 7.1 g/dL / ALK PHOS: 71 U/L / ALT: 33 U/L DA / AST: 20 U/L / GGT: x             PT/INR - ( 09 Oct 2023 01:55 )   PT: 14.8 sec;   INR: 1.31 ratio         PTT - ( 09 Oct 2023 01:55 )  PTT:34.9 sec    LACTATE:Lactate, Blood: 1.3 mmol/L (10-09 @ 07:10)  Lactate, Blood: 1.1 mmol/L (10-09 @ 01:55)  Lactate, Blood: 3.3 mmol/L (10-08 @ 21:10)      ABG -     CULTURES:       RADIOLOGY:< from: CT Abdomen and Pelvis w/ IV Cont (10.08.23 @ 22:26) >  ACC: [id number] EXAM:  CT ABDOMEN AND PELVIS IC   ORDERED BY: ANGIE HARTMANN     PROCEDURE DATE:  10/08/2023          INTERPRETATION:  CLINICAL INFORMATION: 53 years old. Male. abd pain   constipation.    COMPARISON: None.    CONTRAST/COMPLICATIONS:  IV Contrast: Omnipaque 350  90 cc administered  10 cc discarded.  Oral Contrast: NONE  Complications: None reported at time of study completion    PROCEDURE:  CT of the Abdomen and Pelvis was performed.  Sagittal and coronal reformats wereperformed.    FINDINGS:    LOWER CHEST: Within normal limits.    LIVER: 1.4 cm indeterminate hypodensity in the right hepatic lobe, may be   further evaluated with nonemergent contrast-enhanced MRI.  BILE DUCTS: Normal caliber.  GALLBLADDER: Within normal limits.  SPLEEN: Within normal limits.  PANCREAS: Within normal limits.  ADRENALS: Within normal limits.  KIDNEYS/URETERS: Enhance symmetrically. No hydronephrosis.    BLADDER: Within normal limits.  REPRODUCTIVE ORGANS: Prostate is not enlarged.    BOWEL: No bowel obstruction. There are a few mildly prominent small bowel   loops, probably due to reactive ileus. Appendix is thickened, measuring   up to 1.4 cm, with surrounding fat stranding. No free air or evidence of   abscess. Small appendicolith at the appendiceal orifice.  PERITONEUM: No ascites.  VESSELS: Normal caliber abdominal aorta.  RETROPERITONEUM/LYMPH NODES: No lymphadenopathy.  ABDOMINAL WALL: Within normal limits.  BONES: With normal limits.    IMPRESSION:    Acute appendicitis. No free air or evidence of abscess.    --- End of Report ---            ZAID PENDLETON MD; Attending Radiologist  This document has been electronically signed. Oct  8 2023 11:18PM    < end of copied text >  ---------------------------------------------------------------------------------------------------------------------------------------------    ACC: [id number] EXAM:  XR CHEST PORTABLE IMMED 1V   ORDERED BY: ANGIE HARTMANN     PROCEDURE DATE:  10/08/2023          INTERPRETATION:  TIME OF EXAM: October 8, 2023 at 9:14 PM.    CLINICAL INFORMATION: Sepsis.    COMPARISON:  None available    TECHNIQUE:   AP Portable chest x-ray.    INTERPRETATION:    The heart is not enlarged.  The mediastinum and amelia are unremarkable.  There are low lung volumes with a mildly elevated right hemidiaphragm.  The lungs are clear.  No pleural effusion or pneumothorax is seen.  No acute bony abnormality is noted.      IMPRESSION:  Low lung volumes with mildly elevated right hemidiaphragm.    Clear lungs.    --- End of Report ---            SANYA GONZALEZ MD; Attending Radiologist  This document has been electronically signed. Oct  9 2023  8:22AM    < end of copied text >        ROS  [  ] UNABLE TO ELICIT

## 2023-10-09 NOTE — PROGRESS NOTE ADULT - ASSESSMENT
Mr. James is a 53 year old man with no known PMHx (doesnt follow with a PCP) presenting with acute nonperforated appendicitis  Afebrile, recovering appropriately postoperatively    Plan:  - NPO/IVF  - continue IV antibiotics   - continue arevalo to gravity   - pain control PRN   - antiemetic PRN  - ISS  - OOB/ambulate as tolerated   - Incentive spirometry  - mechanical VTE ppx

## 2023-10-09 NOTE — CONSULT NOTE ADULT - GASTROINTESTINAL
soft/no guarding/no rigidity/no organomegaly/no masses palpable/tender/distended/bowel sounds hypoactive details…

## 2023-10-09 NOTE — BRIEF OPERATIVE NOTE - NSICDXBRIEFPROCEDURE_GEN_ALL_CORE_FT
PROCEDURES:  Laparoscopic appendectomy 09-Oct-2023 05:49:25 PERFORATED AND GANGRENOUS Rachel Vergara

## 2023-10-09 NOTE — H&P ADULT - NSHPLABSRESULTS_GEN_ALL_CORE
15.2   18.18 )-----------( 201      ( 08 Oct 2023 21:10 )             43.1       10-08    126<L>  |  92<L>  |  17  ----------------------------<  159<H>  4.5   |  26  |  1.19    Ca    8.8      08 Oct 2023 21:10    TPro  7.9  /  Alb  3.8  /  TBili  5.5<H>  /  DBili  x   /  AST  27  /  ALT  37  /  AlkPhos  74  10-08      PT/INR - ( 08 Oct 2023 21:10 )   PT: 14.7 sec;   INR: 1.30 ratio         PTT - ( 08 Oct 2023 21:10 )  PTT:37.4 sec    ABG - ( 08 Oct 2023 21:10 )  pH: x     /  pCO2: x     /  pO2: x     / HCO3: x     / Base Excess: x     /  SaO2: x       Lactate: 3.3        IMAGING STUDIES:  < from: CT Abdomen and Pelvis w/ IV Cont (10.08.23 @ 22:26) >    CONTRAST/COMPLICATIONS:  IV Contrast: Omnipaque 350  90 cc administered  10 cc discarded.  Oral Contrast: NONE  Complications: None reported at time of study completion    PROCEDURE:  CT of the Abdomen and Pelvis was performed.  Sagittal and coronal reformats wereperformed.    FINDINGS:    LOWER CHEST: Within normal limits.    LIVER: 1.4 cm indeterminate hypodensity in the right hepatic lobe, may be   further evaluated with nonemergent contrast-enhanced MRI.  BILE DUCTS: Normal caliber.  GALLBLADDER: Within normal limits.  SPLEEN: Within normal limits.  PANCREAS: Within normal limits.  ADRENALS: Within normal limits.  KIDNEYS/URETERS: Enhance symmetrically. No hydronephrosis.    BLADDER: Within normal limits.  REPRODUCTIVE ORGANS: Prostate is not enlarged.    BOWEL: No bowel obstruction. There are a few mildly prominent small bowel   loops, probably due to reactive ileus. Appendix is thickened, measuring   up to 1.4 cm, with surrounding fat stranding. No free air or evidence of   abscess. Small appendicolith at the appendiceal orifice.  PERITONEUM: No ascites.  VESSELS: Normal caliber abdominal aorta.  RETROPERITONEUM/LYMPH NODES: No lymphadenopathy.  ABDOMINAL WALL: Within normal limits.  BONES: With normal limits.    IMPRESSION:    Acute appendicitis. No free air or evidence of abscess.    < end of copied text >

## 2023-10-09 NOTE — H&P ADULT - NSHPPHYSICALEXAM_GEN_ALL_CORE
VITAL SIGNS:  ICU Vital Signs Last 24 Hrs  T(C): 38.8 (08 Oct 2023 23:36), Max: 39.4 (08 Oct 2023 20:56)  T(F): 101.8 (08 Oct 2023 23:36), Max: 103 (08 Oct 2023 20:56)  HR: 121 (08 Oct 2023 23:36) (120 - 158)  BP: 114/75 (08 Oct 2023 23:36) (114/75 - 130/91)  BP(mean): --  ABP: --  ABP(mean): --  RR: 24 (08 Oct 2023 23:36) (22 - 24)  SpO2: 97% (08 Oct 2023 23:36) (96% - 98%)    O2 Parameters below as of 08 Oct 2023 23:36  Patient On (Oxygen Delivery Method): room air            PHYSICAL EXAMINATION:  General - well-nourished, no acute distress  Neuro - awake, alert, oriented x4  HEENT - normocephalic  Lungs - breathing comfortably on room air  Heart - sinus tachycardia on monitor  Abdomen - softly distended, diffusely tender with focal peritonitis in RLQ  Extremities - all four extremities are warm

## 2023-10-09 NOTE — CONSULT NOTE ADULT - ASSESSMENT
Acute Gangrenous Appendicitis - complicated by perforation  Peritonitis ( focal)  Sepsis  Fevers  Leukocytosis    Plan - Cont Maxipime 1gm iv q8hrs  Cont Flagyl 500mgs iv q8hrs Acute Gangrenous Appendicitis - complicated by perforation  Peritonitis ( focal)  Sepsis  Fevers  Leukocytosis    Plan - Cont Maxipime 1gm iv q8hrs  Cont Flagyl 500mgs iv q8hrs  when stable to go home will switch to PO antibiotics to complete a total of 14 days of all antibiotics.

## 2023-10-09 NOTE — H&P ADULT - HISTORY OF PRESENT ILLNESS
Mr. James is a 53 year old man with no known PMHx (doesnt follow with a PCP) presenting with one day of pain. Pain onset happened suddenly and worsened throughout the day. He reports associated nausea, vomiting, fever, constipation, and decreased frequency of urination. Denies yellowing of eyes or skin, pale colored stool, or dark urine. Has never had a colonoscopy.     In the ED patient febrile to 101.9, tachycardic to 130s, and normotensive. Labs notable for leukocytosis to 18, hyponatremia to 126. TBili is elevated to 5.5 with normal ALT/AST. Lactate 3.3. Patient given 2.2L bolus NS and started on Zosyn.

## 2023-10-09 NOTE — H&P ADULT - ATTENDING COMMENTS
Pt seen and examined in ED. Family, sister at bedside. Agree with above note per Dr. Daniels. Reviewed CT images. On exam pt distended with diffuse tenderness. + guarding no rebound. Pt with signs of sepsis given tachycardia,temp 39.4, WBC 18. To OR for appendedctomy. PARQ discussion held with patient. He expressed understanding and agrees to proceed. Pt seen and examined in ED. Family, sister at bedside. Agree with above note per Dr. Daniels. Reviewed CT images. On exam pt distended with diffuse tenderness. + guarding no rebound. Pt with signs of sepsis given tachycardia,temp 39.4, WBC 18. To OR for appendectomy. PARQ discussion held with patient. He expressed understanding and agrees to proceed.

## 2023-10-09 NOTE — PROGRESS NOTE ADULT - SUBJECTIVE AND OBJECTIVE BOX
INTERVAL HPI/OVERNIGHT EVENTS: NAEO. Afebrile overnight, tachycardic to 110. Patient seen and examined at bedside. Abdominal pain is controlled with current regimen. Denies CP, SOB, nausea, emesis, fevers, chills.     Vital Signs Last 24 Hrs  T(C): 36.9 (09 Oct 2023 06:54), Max: 39.4 (08 Oct 2023 20:56)  T(F): 98.5 (09 Oct 2023 06:54), Max: 103 (08 Oct 2023 20:56)  HR: 110 (09 Oct 2023 06:54) (110 - 158)  BP: 108/70 (09 Oct 2023 06:54) (100/75 - 136/85)  BP(mean): 82 (09 Oct 2023 06:54) (82 - 101)  RR: 18 (09 Oct 2023 06:54) (18 - 24)  SpO2: 93% (09 Oct 2023 06:54) (93% - 100%)    Parameters below as of 09 Oct 2023 06:54  Patient On (Oxygen Delivery Method): room air      I&O's Detail    08 Oct 2023 07:01  -  09 Oct 2023 07:00  --------------------------------------------------------  IN:    Lactated Ringers Bolus: 2000 mL    sodium chloride 0.9%: 250 mL  Total IN: 2250 mL    OUT:    Blood Loss (mL): 50 mL    Bulb (mL): 10 mL    Bulb (mL): 10 mL    Indwelling Catheter - Urethral (mL): 250 mL  Total OUT: 320 mL    Total NET: 1930 mL        cefepime   IVPB 1000 milliGRAM(s) IV Intermittent every 8 hours  cefepime   IVPB      metroNIDAZOLE  IVPB 500 milliGRAM(s) IV Intermittent every 8 hours      Physical Exam:  General: AAOx3, No acute distress  HEENT: NC/AT, trachea midline  Respiratory: Nonlabored breathing, equal chest rise b/l   Skin: No jaundice, no icterus  Abdomen: soft,  nondistended, appropriately ttp. JAVIER x2 with SS drainage. Dressings c/d/i  : Ayala with clear yellow urine  Extremities: non edematous, no calf pain bilaterally  Lines/Drains/Tubes:     Drains/Tubes:     10-08-23 @ 07:01  -  10-09-23 @ 07:00  --------------------------------------------------------  IN: 2250 mL / OUT: 320 mL / NET: 1930 mL        Labs:                        15.2   18.18 )-----------( 201      ( 08 Oct 2023 21:10 )             43.1     10-09    131<L>  |  101  |  13  ----------------------------<  142<H>  3.4<L>   |  22  |  0.91    Ca    8.3<L>      09 Oct 2023 01:55  Phos  2.1     10-09  Mg     1.7     10-09    TPro  7.1  /  Alb  3.4<L>  /  TBili  5.7<H>  /  DBili  x   /  AST  20  /  ALT  33  /  AlkPhos  71  10-09    PT/INR - ( 09 Oct 2023 01:55 )   PT: 14.8 sec;   INR: 1.31 ratio         PTT - ( 09 Oct 2023 01:55 )  PTT:34.9 sec    RADIOLOGY & ADDITIONAL STUDIES:

## 2023-10-10 LAB
ANION GAP SERPL CALC-SCNC: 9 MMOL/L — SIGNIFICANT CHANGE UP (ref 5–17)
BUN SERPL-MCNC: 15 MG/DL — SIGNIFICANT CHANGE UP (ref 7–18)
CALCIUM SERPL-MCNC: 8 MG/DL — LOW (ref 8.4–10.5)
CHLORIDE SERPL-SCNC: 109 MMOL/L — HIGH (ref 96–108)
CO2 SERPL-SCNC: 20 MMOL/L — LOW (ref 22–31)
CREAT SERPL-MCNC: 0.72 MG/DL — SIGNIFICANT CHANGE UP (ref 0.5–1.3)
EGFR: 109 ML/MIN/1.73M2 — SIGNIFICANT CHANGE UP
GLUCOSE BLDC GLUCOMTR-MCNC: 114 MG/DL — HIGH (ref 70–99)
GLUCOSE BLDC GLUCOMTR-MCNC: 117 MG/DL — HIGH (ref 70–99)
GLUCOSE BLDC GLUCOMTR-MCNC: 128 MG/DL — HIGH (ref 70–99)
GLUCOSE BLDC GLUCOMTR-MCNC: 128 MG/DL — HIGH (ref 70–99)
GLUCOSE BLDC GLUCOMTR-MCNC: 131 MG/DL — HIGH (ref 70–99)
GLUCOSE BLDC GLUCOMTR-MCNC: 140 MG/DL — HIGH (ref 70–99)
GLUCOSE BLDC GLUCOMTR-MCNC: 140 MG/DL — HIGH (ref 70–99)
GLUCOSE SERPL-MCNC: 123 MG/DL — HIGH (ref 70–99)
HCT VFR BLD CALC: 33.7 % — LOW (ref 39–50)
HCT VFR BLD CALC: 35.4 % — LOW (ref 39–50)
HGB BLD-MCNC: 11.4 G/DL — LOW (ref 13–17)
HGB BLD-MCNC: 12 G/DL — LOW (ref 13–17)
MCHC RBC-ENTMCNC: 30.2 PG — SIGNIFICANT CHANGE UP (ref 27–34)
MCHC RBC-ENTMCNC: 30.4 PG — SIGNIFICANT CHANGE UP (ref 27–34)
MCHC RBC-ENTMCNC: 33.8 GM/DL — SIGNIFICANT CHANGE UP (ref 32–36)
MCHC RBC-ENTMCNC: 33.9 GM/DL — SIGNIFICANT CHANGE UP (ref 32–36)
MCV RBC AUTO: 89.4 FL — SIGNIFICANT CHANGE UP (ref 80–100)
MCV RBC AUTO: 89.6 FL — SIGNIFICANT CHANGE UP (ref 80–100)
NRBC # BLD: 0 /100 WBCS — SIGNIFICANT CHANGE UP (ref 0–0)
NRBC # BLD: 0 /100 WBCS — SIGNIFICANT CHANGE UP (ref 0–0)
PLATELET # BLD AUTO: 160 K/UL — SIGNIFICANT CHANGE UP (ref 150–400)
PLATELET # BLD AUTO: 163 K/UL — SIGNIFICANT CHANGE UP (ref 150–400)
POTASSIUM SERPL-MCNC: 3.6 MMOL/L — SIGNIFICANT CHANGE UP (ref 3.5–5.3)
POTASSIUM SERPL-SCNC: 3.6 MMOL/L — SIGNIFICANT CHANGE UP (ref 3.5–5.3)
RBC # BLD: 3.77 M/UL — LOW (ref 4.2–5.8)
RBC # BLD: 3.95 M/UL — LOW (ref 4.2–5.8)
RBC # FLD: 13.4 % — SIGNIFICANT CHANGE UP (ref 10.3–14.5)
RBC # FLD: 13.5 % — SIGNIFICANT CHANGE UP (ref 10.3–14.5)
SODIUM SERPL-SCNC: 138 MMOL/L — SIGNIFICANT CHANGE UP (ref 135–145)
WBC # BLD: 12.83 K/UL — HIGH (ref 3.8–10.5)
WBC # BLD: 14.18 K/UL — HIGH (ref 3.8–10.5)
WBC # FLD AUTO: 12.83 K/UL — HIGH (ref 3.8–10.5)
WBC # FLD AUTO: 14.18 K/UL — HIGH (ref 3.8–10.5)

## 2023-10-10 RX ORDER — SODIUM CHLORIDE 9 MG/ML
1000 INJECTION, SOLUTION INTRAVENOUS
Refills: 0 | Status: DISCONTINUED | OUTPATIENT
Start: 2023-10-10 | End: 2023-10-15

## 2023-10-10 RX ADMIN — CEFEPIME 100 MILLIGRAM(S): 1 INJECTION, POWDER, FOR SOLUTION INTRAMUSCULAR; INTRAVENOUS at 13:40

## 2023-10-10 RX ADMIN — CEFEPIME 100 MILLIGRAM(S): 1 INJECTION, POWDER, FOR SOLUTION INTRAMUSCULAR; INTRAVENOUS at 06:08

## 2023-10-10 RX ADMIN — Medication 100 MILLIGRAM(S): at 13:40

## 2023-10-10 RX ADMIN — Medication 15 MILLIGRAM(S): at 06:14

## 2023-10-10 RX ADMIN — SODIUM CHLORIDE 115 MILLILITER(S): 9 INJECTION, SOLUTION INTRAVENOUS at 16:45

## 2023-10-10 RX ADMIN — Medication 15 MILLIGRAM(S): at 06:29

## 2023-10-10 RX ADMIN — SODIUM CHLORIDE 115 MILLILITER(S): 9 INJECTION, SOLUTION INTRAVENOUS at 22:14

## 2023-10-10 RX ADMIN — Medication 100 MILLIGRAM(S): at 06:08

## 2023-10-10 NOTE — PROGRESS NOTE ADULT - ASSESSMENT
Acute Gangrenous Appendicitis - complicated by perforation  Peritonitis ( focal)  Sepsis - resolved  Fevers - improved  Leukocytosis - decreasing    Plan - Cont Maxipime 1gm iv q8hrs  Cont Flagyl 500mgs iv q8hrs  when stable to go home will switch to PO antibiotics to complete a total of 14 days of all antibiotics.

## 2023-10-10 NOTE — PROGRESS NOTE ADULT - SUBJECTIVE AND OBJECTIVE BOX
53y Male    Meds:  cefepime   IVPB 1000 milliGRAM(s) IV Intermittent every 8 hours  cefepime   IVPB      metroNIDAZOLE  IVPB 500 milliGRAM(s) IV Intermittent every 8 hours    Allergies    No Known Allergies    Intolerances        VITALS:  Vital Signs Last 24 Hrs  T(C): 37.2 (10 Oct 2023 12:28), Max: 37.6 (09 Oct 2023 20:08)  T(F): 98.9 (10 Oct 2023 12:28), Max: 99.6 (09 Oct 2023 20:08)  HR: 109 (10 Oct 2023 12:28) (96 - 112)  BP: 115/79 (10 Oct 2023 12:28) (110/70 - 124/80)  BP(mean): --  RR: 17 (10 Oct 2023 12:28) (16 - 18)  SpO2: 95% (10 Oct 2023 12:28) (95% - 96%)    Parameters below as of 10 Oct 2023 12:28  Patient On (Oxygen Delivery Method): room air        LABS/DIAGNOSTIC TESTS:                          11.4   12.83 )-----------( 160      ( 10 Oct 2023 10:14 )             33.7         10-10    138  |  109<H>  |  15  ----------------------------<  123<H>  3.6   |  20<L>  |  0.72    Ca    8.0<L>      10 Oct 2023 05:28  Phos  2.1     10-09  Mg     1.7     10-09    TPro  7.1  /  Alb  3.4<L>  /  TBili  5.7<H>  /  DBili  x   /  AST  20  /  ALT  33  /  AlkPhos  71  10-09      LIVER FUNCTIONS - ( 09 Oct 2023 01:55 )  Alb: 3.4 g/dL / Pro: 7.1 g/dL / ALK PHOS: 71 U/L / ALT: 33 U/L DA / AST: 20 U/L / GGT: x             CULTURES: Clean Catch Clean Catch (Midstream)  10-08 @ 22:50   No growth  --  --      .Blood Blood-Peripheral  10-08 @ 21:20   No growth at 24 hours  --  --      .Blood Blood-Peripheral  10-08 @ 21:10   No growth at 24 hours  --  --            RADIOLOGY:      ROS:  [  ] UNABLE TO ELICIT 53y Male who is looking better but is c/o abdominal pain today, he has no fevers or chills , no nausea or vomiting either. He feels like passing flatus but has not as yet. He is still NPO as per surgery. His WBC count has decreased. He has an elevated T. Bili but his other LFTs are WNL.    Meds:  cefepime   IVPB 1000 milliGRAM(s) IV Intermittent every 8 hours  cefepime   IVPB      metroNIDAZOLE  IVPB 500 milliGRAM(s) IV Intermittent every 8 hours    Allergies    No Known Allergies    Intolerances        VITALS:  Vital Signs Last 24 Hrs  T(C): 37.2 (10 Oct 2023 12:28), Max: 37.6 (09 Oct 2023 20:08)  T(F): 98.9 (10 Oct 2023 12:28), Max: 99.6 (09 Oct 2023 20:08)  HR: 109 (10 Oct 2023 12:28) (96 - 112)  BP: 115/79 (10 Oct 2023 12:28) (110/70 - 124/80)  BP(mean): --  RR: 17 (10 Oct 2023 12:28) (16 - 18)  SpO2: 95% (10 Oct 2023 12:28) (95% - 96%)    Parameters below as of 10 Oct 2023 12:28  Patient On (Oxygen Delivery Method): room air        LABS/DIAGNOSTIC TESTS:                          11.4   12.83 )-----------( 160      ( 10 Oct 2023 10:14 )             33.7         10-10    138  |  109<H>  |  15  ----------------------------<  123<H>  3.6   |  20<L>  |  0.72    Ca    8.0<L>      10 Oct 2023 05:28  Phos  2.1     10-09  Mg     1.7     10-09    TPro  7.1  /  Alb  3.4<L>  /  TBili  5.7<H>  /  DBili  x   /  AST  20  /  ALT  33  /  AlkPhos  71  10-09      LIVER FUNCTIONS - ( 09 Oct 2023 01:55 )  Alb: 3.4 g/dL / Pro: 7.1 g/dL / ALK PHOS: 71 U/L / ALT: 33 U/L DA / AST: 20 U/L / GGT: x             CULTURES: Clean Catch Clean Catch (Midstream)  10-08 @ 22:50   No growth  --  --      .Blood Blood-Peripheral  10-08 @ 21:20   No growth at 24 hours  --  --      .Blood Blood-Peripheral  10-08 @ 21:10   No growth at 24 hours  --  --            RADIOLOGY:      ROS:  [  ] UNABLE TO ELICIT

## 2023-10-10 NOTE — PROGRESS NOTE ADULT - ASSESSMENT
OMAYRA GASCA is a 53y Male POD#1 s/p lap appendectomy. Found to have gangrenous, perforated appendicitis   Tachy overnight, afebrile     PLAN   - f/u repeat CBC  - NPO/ IVF  - IV abx, appreciate ID recommendations  - GI ppx  - DVT ppx   - OOB/ambulate  - Incentive spirometer

## 2023-10-10 NOTE — PROGRESS NOTE ADULT - SUBJECTIVE AND OBJECTIVE BOX
INTERVAL HPI/OVERNIGHT EVENTS: NAEO. AVSS. Patient seen and examined at bedside. No acute complaints. Denies fevers, chills, nausea, emesis. Denies flatus/bm.    Vital Signs Last 24 Hrs  T(C): 36.7 (10 Oct 2023 05:18), Max: 37.6 (09 Oct 2023 20:08)  T(F): 98 (10 Oct 2023 05:18), Max: 99.6 (09 Oct 2023 20:08)  HR: 96 (10 Oct 2023 05:18) (96 - 112)  BP: 124/80 (10 Oct 2023 05:18) (99/66 - 124/80)  BP(mean): --  RR: 17 (10 Oct 2023 05:18) (16 - 18)  SpO2: 96% (10 Oct 2023 05:18) (95% - 98%)    Parameters below as of 10 Oct 2023 05:18  Patient On (Oxygen Delivery Method): room air      I&O's Detail    09 Oct 2023 07:01  -  10 Oct 2023 07:00  --------------------------------------------------------  IN:    IV PiggyBack: 300 mL    sodium chloride 0.9%: 1500 mL  Total IN: 1800 mL    OUT:    Bulb (mL): 215 mL    Bulb (mL): 50 mL    Indwelling Catheter - Urethral (mL): 1320 mL  Total OUT: 1585 mL    Total NET: 215 mL        cefepime   IVPB 1000 milliGRAM(s) IV Intermittent every 8 hours  cefepime   IVPB      metroNIDAZOLE  IVPB 500 milliGRAM(s) IV Intermittent every 8 hours      Physical Exam:  General: AAOx3, No acute distress  HEENT: NC/AT, trachea midline  Respiratory: Nonlabored breathing, equal chest rise b/l   Skin: No jaundice, no icterus  Abdomen: soft,  minimal distention, incisions c/d/i. JAVIER x2 with serosanguinous drainage   : arevalo with clear yellow urine  Extremities: non edematous, no calf pain bilaterally  Lines/Drains/Tubes:     Drains/Tubes:     10-09-23 @ 07:01  -  10-10-23 @ 07:00  --------------------------------------------------------  IN: 1800 mL / OUT: 1585 mL / NET: 215 mL        Labs:                        12.0   14.18 )-----------( 163      ( 10 Oct 2023 05:28 )             35.4     10-10    138  |  109<H>  |  15  ----------------------------<  123<H>  3.6   |  20<L>  |  0.72    Ca    8.0<L>      10 Oct 2023 05:28  Phos  2.1     10-09  Mg     1.7     10-09    TPro  7.1  /  Alb  3.4<L>  /  TBili  5.7<H>  /  DBili  x   /  AST  20  /  ALT  33  /  AlkPhos  71  10-09    PT/INR - ( 09 Oct 2023 01:55 )   PT: 14.8 sec;   INR: 1.31 ratio         PTT - ( 09 Oct 2023 01:55 )  PTT:34.9 sec    RADIOLOGY & ADDITIONAL STUDIES:

## 2023-10-11 LAB
ANION GAP SERPL CALC-SCNC: 8 MMOL/L — SIGNIFICANT CHANGE UP (ref 5–17)
BUN SERPL-MCNC: 14 MG/DL — SIGNIFICANT CHANGE UP (ref 7–18)
CALCIUM SERPL-MCNC: 7.7 MG/DL — LOW (ref 8.4–10.5)
CHLORIDE SERPL-SCNC: 108 MMOL/L — SIGNIFICANT CHANGE UP (ref 96–108)
CO2 SERPL-SCNC: 23 MMOL/L — SIGNIFICANT CHANGE UP (ref 22–31)
CREAT SERPL-MCNC: 0.59 MG/DL — SIGNIFICANT CHANGE UP (ref 0.5–1.3)
EGFR: 116 ML/MIN/1.73M2 — SIGNIFICANT CHANGE UP
GLUCOSE BLDC GLUCOMTR-MCNC: 140 MG/DL — HIGH (ref 70–99)
GLUCOSE BLDC GLUCOMTR-MCNC: 142 MG/DL — HIGH (ref 70–99)
GLUCOSE BLDC GLUCOMTR-MCNC: 155 MG/DL — HIGH (ref 70–99)
GLUCOSE SERPL-MCNC: 137 MG/DL — HIGH (ref 70–99)
HCT VFR BLD CALC: 31.9 % — LOW (ref 39–50)
HGB BLD-MCNC: 11 G/DL — LOW (ref 13–17)
MAGNESIUM SERPL-MCNC: 2.3 MG/DL — SIGNIFICANT CHANGE UP (ref 1.6–2.6)
MCHC RBC-ENTMCNC: 30.3 PG — SIGNIFICANT CHANGE UP (ref 27–34)
MCHC RBC-ENTMCNC: 34.5 GM/DL — SIGNIFICANT CHANGE UP (ref 32–36)
MCV RBC AUTO: 87.9 FL — SIGNIFICANT CHANGE UP (ref 80–100)
NRBC # BLD: 0 /100 WBCS — SIGNIFICANT CHANGE UP (ref 0–0)
PHOSPHATE SERPL-MCNC: 0.6 MG/DL — CRITICAL LOW (ref 2.5–4.5)
PLATELET # BLD AUTO: 205 K/UL — SIGNIFICANT CHANGE UP (ref 150–400)
POTASSIUM SERPL-MCNC: 3.2 MMOL/L — LOW (ref 3.5–5.3)
POTASSIUM SERPL-SCNC: 3.2 MMOL/L — LOW (ref 3.5–5.3)
RBC # BLD: 3.63 M/UL — LOW (ref 4.2–5.8)
RBC # FLD: 13.7 % — SIGNIFICANT CHANGE UP (ref 10.3–14.5)
SODIUM SERPL-SCNC: 139 MMOL/L — SIGNIFICANT CHANGE UP (ref 135–145)
WBC # BLD: 10.24 K/UL — SIGNIFICANT CHANGE UP (ref 3.8–10.5)
WBC # FLD AUTO: 10.24 K/UL — SIGNIFICANT CHANGE UP (ref 3.8–10.5)

## 2023-10-11 RX ORDER — POTASSIUM PHOSPHATE, MONOBASIC POTASSIUM PHOSPHATE, DIBASIC 236; 224 MG/ML; MG/ML
15 INJECTION, SOLUTION INTRAVENOUS ONCE
Refills: 0 | Status: COMPLETED | OUTPATIENT
Start: 2023-10-11 | End: 2023-10-11

## 2023-10-11 RX ADMIN — POTASSIUM PHOSPHATE, MONOBASIC POTASSIUM PHOSPHATE, DIBASIC 62.5 MILLIMOLE(S): 236; 224 INJECTION, SOLUTION INTRAVENOUS at 11:38

## 2023-10-11 RX ADMIN — Medication 100 MILLIGRAM(S): at 14:43

## 2023-10-11 RX ADMIN — CEFEPIME 100 MILLIGRAM(S): 1 INJECTION, POWDER, FOR SOLUTION INTRAMUSCULAR; INTRAVENOUS at 21:10

## 2023-10-11 RX ADMIN — CEFEPIME 100 MILLIGRAM(S): 1 INJECTION, POWDER, FOR SOLUTION INTRAMUSCULAR; INTRAVENOUS at 14:52

## 2023-10-11 RX ADMIN — Medication 1: at 05:50

## 2023-10-11 RX ADMIN — SODIUM CHLORIDE 115 MILLILITER(S): 9 INJECTION, SOLUTION INTRAVENOUS at 11:49

## 2023-10-11 RX ADMIN — Medication 100 MILLIGRAM(S): at 21:10

## 2023-10-11 RX ADMIN — CEFEPIME 100 MILLIGRAM(S): 1 INJECTION, POWDER, FOR SOLUTION INTRAMUSCULAR; INTRAVENOUS at 05:50

## 2023-10-11 RX ADMIN — Medication 100 MILLIGRAM(S): at 05:51

## 2023-10-11 NOTE — PROGRESS NOTE ADULT - SUBJECTIVE AND OBJECTIVE BOX
53y Male    Meds:  cefepime   IVPB      cefepime   IVPB 1000 milliGRAM(s) IV Intermittent every 8 hours  metroNIDAZOLE  IVPB 500 milliGRAM(s) IV Intermittent every 8 hours    Allergies    No Known Allergies    Intolerances        VITALS:  Vital Signs Last 24 Hrs  T(C): 37 (11 Oct 2023 13:45), Max: 37.4 (11 Oct 2023 05:25)  T(F): 98.6 (11 Oct 2023 13:45), Max: 99.4 (11 Oct 2023 05:25)  HR: 97 (11 Oct 2023 13:45) (97 - 104)  BP: 132/81 (11 Oct 2023 13:45) (130/87 - 136/82)  BP(mean): --  RR: 18 (11 Oct 2023 13:45) (16 - 18)  SpO2: 95% (11 Oct 2023 13:45) (95% - 97%)    Parameters below as of 11 Oct 2023 13:45  Patient On (Oxygen Delivery Method): room air        LABS/DIAGNOSTIC TESTS:                          11.0   10.24 )-----------( 205      ( 11 Oct 2023 05:35 )             31.9         10-11    139  |  108  |  14  ----------------------------<  137<H>  3.2<L>   |  23  |  0.59    Ca    7.7<L>      11 Oct 2023 05:35  Phos  0.6     10-11  Mg     2.3     10-11            CULTURES: Clean Catch Clean Catch (Midstream)  10-08 @ 22:50   No growth  --  --      .Blood Blood-Peripheral  10-08 @ 21:20   No growth at 48 Hours  --  --      .Blood Blood-Peripheral  10-08 @ 21:10   No growth at 48 Hours  --  --            RADIOLOGY:      ROS:  [  ] UNABLE TO ELICIT 53y Male who is looking and feeling better as far as his abdominal pain goes but has been having some visual hallucinations on and off. He has no fevers or chills, he has passed some flatus and hence started on clears.    Meds:  cefepime   IVPB      cefepime   IVPB 1000 milliGRAM(s) IV Intermittent every 8 hours  metroNIDAZOLE  IVPB 500 milliGRAM(s) IV Intermittent every 8 hours    Allergies    No Known Allergies    Intolerances        VITALS:  Vital Signs Last 24 Hrs  T(C): 37 (11 Oct 2023 13:45), Max: 37.4 (11 Oct 2023 05:25)  T(F): 98.6 (11 Oct 2023 13:45), Max: 99.4 (11 Oct 2023 05:25)  HR: 97 (11 Oct 2023 13:45) (97 - 104)  BP: 132/81 (11 Oct 2023 13:45) (130/87 - 136/82)  BP(mean): --  RR: 18 (11 Oct 2023 13:45) (16 - 18)  SpO2: 95% (11 Oct 2023 13:45) (95% - 97%)    Parameters below as of 11 Oct 2023 13:45  Patient On (Oxygen Delivery Method): room air        LABS/DIAGNOSTIC TESTS:                          11.0   10.24 )-----------( 205      ( 11 Oct 2023 05:35 )             31.9         10-11    139  |  108  |  14  ----------------------------<  137<H>  3.2<L>   |  23  |  0.59    Ca    7.7<L>      11 Oct 2023 05:35  Phos  0.6     10-11  Mg     2.3     10-11            CULTURES: Clean Catch Clean Catch (Midstream)  10-08 @ 22:50   No growth  --  --      .Blood Blood-Peripheral  10-08 @ 21:20   No growth at 48 Hours  --  --      .Blood Blood-Peripheral  10-08 @ 21:10   No growth at 48 Hours  --  --            RADIOLOGY:      ROS:  [  ] UNABLE TO ELICIT

## 2023-10-11 NOTE — PROGRESS NOTE ADULT - SUBJECTIVE AND OBJECTIVE BOX
INTERVAL HPI/OVERNIGHT EVENTS:  Patient seen and examined at bedside.    Pt resting comfortably. No acute complaints.  States that he is still having left sided abdominal pain but it has improved since yesterday.   Denies N/V. Admits to 2 episodes of flatus this morning and denies any BM.   Denies fever, chills, sob, chest pain or any other constitutional complaints.     He states that when he closes his eyes he sees clear imagery of gardens and rocks. This is the first time he had this episode. Denies any drug use in the past and states he hasnt been taking pain medication in the hospital. Denies any vision changes or any other constitutional complaints.     MEDICATIONS  (STANDING):  cefepime   IVPB 1000 milliGRAM(s) IV Intermittent every 8 hours  cefepime   IVPB      dextrose 5% + sodium chloride 0.9%. 1000 milliLiter(s) (115 mL/Hr) IV Continuous <Continuous>  dextrose 5%. 1000 milliLiter(s) (50 mL/Hr) IV Continuous <Continuous>  dextrose 5%. 1000 milliLiter(s) (100 mL/Hr) IV Continuous <Continuous>  dextrose 50% Injectable 12.5 Gram(s) IV Push once  dextrose 50% Injectable 25 Gram(s) IV Push once  dextrose 50% Injectable 25 Gram(s) IV Push once  glucagon  Injectable 1 milliGRAM(s) IntraMuscular once  insulin lispro (ADMELOG) corrective regimen sliding scale   SubCutaneous every 6 hours  metroNIDAZOLE  IVPB 500 milliGRAM(s) IV Intermittent every 8 hours  potassium phosphate IVPB 15 milliMole(s) IV Intermittent once  sodium chloride 0.9%. 1000 milliLiter(s) (125 mL/Hr) IV Continuous <Continuous>    MEDICATIONS  (PRN):  dextrose Oral Gel 15 Gram(s) Oral once PRN Blood Glucose LESS THAN 70 milliGRAM(s)/deciliter  ketorolac   Injectable 15 milliGRAM(s) IV Push every 6 hours PRN Moderate Pain (4 - 6)      Vital Signs Last 24 Hrs  T(C): 37.4 (11 Oct 2023 05:25), Max: 37.4 (11 Oct 2023 05:25)  T(F): 99.4 (11 Oct 2023 05:25), Max: 99.4 (11 Oct 2023 05:25)  HR: 98 (11 Oct 2023 05:25) (98 - 109)  BP: 136/82 (11 Oct 2023 05:25) (115/79 - 136/82)  BP(mean): --  RR: 16 (11 Oct 2023 05:25) (16 - 18)  SpO2: 95% (11 Oct 2023 05:25) (95% - 97%)    Parameters below as of 11 Oct 2023 05:25  Patient On (Oxygen Delivery Method): room air        Physical:  General: A&Ox3. NAD.  Respiratory: non labored  Skin: warm and dry   Abdomen: Softly distended, tender to palpation in the LLQ   JAVIER x2 in place draining serosanguineous output   dressing clean, dry and intact,   : no arevalo in place, voiding   Extremities: non edematous, no calf pain bilaterally    I&O's Detail    10 Oct 2023 07:01  -  11 Oct 2023 07:00  --------------------------------------------------------  IN:  Total IN: 0 mL    OUT:    Bulb (mL): 170 mL    Bulb (mL): 70 mL    Indwelling Catheter - Urethral (mL): 300 mL    Voided (mL): 300 mL  Total OUT: 840 mL    Total NET: -840 mL          LABS:                        11.0   10.24 )-----------( 205      ( 11 Oct 2023 05:35 )             31.9             10-11    139  |  108  |  14  ----------------------------<  137<H>  3.2<L>   |  23  |  0.59    Ca    7.7<L>      11 Oct 2023 05:35  Phos  0.6     10-11  Mg     2.3     10-11

## 2023-10-11 NOTE — PROGRESS NOTE ADULT - ASSESSMENT
53y.o. Male s/p gangrenous lap appendectomy POD #1  afebrile, wbc wnl   hypokalemic, hypophasphetemic; can be cause of vivid imagery  +flatus    - clear liquid diet, advance as tolerated  - IVF  - electrolytes repleted   - iv abx, f/u ID recs  - dvt ppx, oob, ambulate as tolerated  - pain meds, antiemetic, antipyretic prn   - discussed and agreed with Dr. Aguilar     This note and all of its recommendations herein are preliminary until co-signed by an attending physician

## 2023-10-11 NOTE — PROGRESS NOTE ADULT - ASSESSMENT
Acute Gangrenous Appendicitis - complicated by perforation  Peritonitis ( focal)  Fevers - improved  Leukocytosis - decreasing    Plan - Cont Maxipime 1gm iv q8hrs  Cont Flagyl 500mgs iv q8hrs  when stable to go home will switch to PO antibiotics to complete a total of 14 days of all antibiotics.

## 2023-10-12 ENCOUNTER — TRANSCRIPTION ENCOUNTER (OUTPATIENT)
Age: 53
End: 2023-10-12

## 2023-10-12 LAB
ANION GAP SERPL CALC-SCNC: 6 MMOL/L — SIGNIFICANT CHANGE UP (ref 5–17)
ANISOCYTOSIS BLD QL: SLIGHT — SIGNIFICANT CHANGE UP
BASOPHILS # BLD AUTO: 0 K/UL — SIGNIFICANT CHANGE UP (ref 0–0.2)
BASOPHILS NFR BLD AUTO: 0 % — SIGNIFICANT CHANGE UP (ref 0–2)
BUN SERPL-MCNC: 11 MG/DL — SIGNIFICANT CHANGE UP (ref 7–18)
CALCIUM SERPL-MCNC: 7.9 MG/DL — LOW (ref 8.4–10.5)
CHLORIDE SERPL-SCNC: 108 MMOL/L — SIGNIFICANT CHANGE UP (ref 96–108)
CO2 SERPL-SCNC: 25 MMOL/L — SIGNIFICANT CHANGE UP (ref 22–31)
CREAT SERPL-MCNC: 0.63 MG/DL — SIGNIFICANT CHANGE UP (ref 0.5–1.3)
DACRYOCYTES BLD QL SMEAR: SLIGHT — SIGNIFICANT CHANGE UP
EGFR: 114 ML/MIN/1.73M2 — SIGNIFICANT CHANGE UP
EOSINOPHIL # BLD AUTO: 0 K/UL — SIGNIFICANT CHANGE UP (ref 0–0.5)
EOSINOPHIL NFR BLD AUTO: 0 % — SIGNIFICANT CHANGE UP (ref 0–6)
GIANT PLATELETS BLD QL SMEAR: PRESENT — SIGNIFICANT CHANGE UP
GLUCOSE BLDC GLUCOMTR-MCNC: 110 MG/DL — HIGH (ref 70–99)
GLUCOSE BLDC GLUCOMTR-MCNC: 120 MG/DL — HIGH (ref 70–99)
GLUCOSE BLDC GLUCOMTR-MCNC: 123 MG/DL — HIGH (ref 70–99)
GLUCOSE BLDC GLUCOMTR-MCNC: 124 MG/DL — HIGH (ref 70–99)
GLUCOSE BLDC GLUCOMTR-MCNC: 129 MG/DL — HIGH (ref 70–99)
GLUCOSE BLDC GLUCOMTR-MCNC: 132 MG/DL — HIGH (ref 70–99)
GLUCOSE SERPL-MCNC: 131 MG/DL — HIGH (ref 70–99)
HCT VFR BLD CALC: 33.5 % — LOW (ref 39–50)
HGB BLD-MCNC: 11.4 G/DL — LOW (ref 13–17)
LYMPHOCYTES # BLD AUTO: 1.34 K/UL — SIGNIFICANT CHANGE UP (ref 1–3.3)
LYMPHOCYTES # BLD AUTO: 15 % — SIGNIFICANT CHANGE UP (ref 13–44)
MAGNESIUM SERPL-MCNC: 2.2 MG/DL — SIGNIFICANT CHANGE UP (ref 1.6–2.6)
MANUAL SMEAR VERIFICATION: SIGNIFICANT CHANGE UP
MCHC RBC-ENTMCNC: 30.4 PG — SIGNIFICANT CHANGE UP (ref 27–34)
MCHC RBC-ENTMCNC: 34 GM/DL — SIGNIFICANT CHANGE UP (ref 32–36)
MCV RBC AUTO: 89.3 FL — SIGNIFICANT CHANGE UP (ref 80–100)
MICROCYTES BLD QL: SLIGHT — SIGNIFICANT CHANGE UP
MONOCYTES # BLD AUTO: 0.8 K/UL — SIGNIFICANT CHANGE UP (ref 0–0.9)
MONOCYTES NFR BLD AUTO: 9 % — SIGNIFICANT CHANGE UP (ref 2–14)
NEUTROPHILS # BLD AUTO: 6.79 K/UL — SIGNIFICANT CHANGE UP (ref 1.8–7.4)
NEUTROPHILS NFR BLD AUTO: 76 % — SIGNIFICANT CHANGE UP (ref 43–77)
NRBC # BLD: 0 /100 — SIGNIFICANT CHANGE UP (ref 0–0)
OVALOCYTES BLD QL SMEAR: SLIGHT — SIGNIFICANT CHANGE UP
PHOSPHATE SERPL-MCNC: 1.6 MG/DL — LOW (ref 2.5–4.5)
PLAT MORPH BLD: NORMAL — SIGNIFICANT CHANGE UP
PLATELET # BLD AUTO: 239 K/UL — SIGNIFICANT CHANGE UP (ref 150–400)
PLATELET COUNT - ESTIMATE: NORMAL — SIGNIFICANT CHANGE UP
POIKILOCYTOSIS BLD QL AUTO: SLIGHT — SIGNIFICANT CHANGE UP
POLYCHROMASIA BLD QL SMEAR: SLIGHT — SIGNIFICANT CHANGE UP
POTASSIUM SERPL-MCNC: 3.8 MMOL/L — SIGNIFICANT CHANGE UP (ref 3.5–5.3)
POTASSIUM SERPL-SCNC: 3.8 MMOL/L — SIGNIFICANT CHANGE UP (ref 3.5–5.3)
RBC # BLD: 3.75 M/UL — LOW (ref 4.2–5.8)
RBC # FLD: 13.9 % — SIGNIFICANT CHANGE UP (ref 10.3–14.5)
RBC BLD AUTO: ABNORMAL
SMUDGE CELLS # BLD: PRESENT — SIGNIFICANT CHANGE UP
SODIUM SERPL-SCNC: 139 MMOL/L — SIGNIFICANT CHANGE UP (ref 135–145)
SURGICAL PATHOLOGY STUDY: SIGNIFICANT CHANGE UP
WBC # BLD: 8.93 K/UL — SIGNIFICANT CHANGE UP (ref 3.8–10.5)
WBC # FLD AUTO: 8.93 K/UL — SIGNIFICANT CHANGE UP (ref 3.8–10.5)

## 2023-10-12 RX ORDER — HEPARIN SODIUM 5000 [USP'U]/ML
5000 INJECTION INTRAVENOUS; SUBCUTANEOUS EVERY 8 HOURS
Refills: 0 | Status: DISCONTINUED | OUTPATIENT
Start: 2023-10-12 | End: 2023-10-15

## 2023-10-12 RX ORDER — POTASSIUM PHOSPHATE, MONOBASIC POTASSIUM PHOSPHATE, DIBASIC 236; 224 MG/ML; MG/ML
30 INJECTION, SOLUTION INTRAVENOUS ONCE
Refills: 0 | Status: COMPLETED | OUTPATIENT
Start: 2023-10-12 | End: 2023-10-12

## 2023-10-12 RX ADMIN — SODIUM CHLORIDE 115 MILLILITER(S): 9 INJECTION, SOLUTION INTRAVENOUS at 08:17

## 2023-10-12 RX ADMIN — Medication 100 MILLIGRAM(S): at 05:03

## 2023-10-12 RX ADMIN — CEFEPIME 100 MILLIGRAM(S): 1 INJECTION, POWDER, FOR SOLUTION INTRAMUSCULAR; INTRAVENOUS at 05:03

## 2023-10-12 RX ADMIN — POTASSIUM PHOSPHATE, MONOBASIC POTASSIUM PHOSPHATE, DIBASIC 83.33 MILLIMOLE(S): 236; 224 INJECTION, SOLUTION INTRAVENOUS at 11:40

## 2023-10-12 RX ADMIN — HEPARIN SODIUM 5000 UNIT(S): 5000 INJECTION INTRAVENOUS; SUBCUTANEOUS at 13:15

## 2023-10-12 RX ADMIN — CEFEPIME 100 MILLIGRAM(S): 1 INJECTION, POWDER, FOR SOLUTION INTRAMUSCULAR; INTRAVENOUS at 22:11

## 2023-10-12 RX ADMIN — Medication 100 MILLIGRAM(S): at 22:45

## 2023-10-12 NOTE — PROGRESS NOTE ADULT - ASSESSMENT
53y.o. Male s/p gangrenous lap appendectomy POD #2  afebrile, wbc wnl   hypophasphetemic; can be cause of vivid imagery  +flatus    - clear liquid diet, advance as tolerated  - IVF  - electrolytes repleted   - iv abx, f/u ID recs  - dvt ppx, oob, ambulate as tolerated  - pain meds, antiemetic, antipyretic prn   - discussed and agreed with Dr. Aguilar     This note and all of its recommendations herein are preliminary until co-signed by an attending physician  53y.o. Male s/p gangrenous lap appendectomy POD #2  afebrile, wbc wnl   hypophasphetemic; can be cause of vivid imagery  +flatus    - soft diet, advance as tolerated  - electrolytes repleted   - iv abx, f/u ID recs  - dvt ppx, oob, ambulate as tolerated  - pain meds, antiemetic, antipyretic prn   - discussed and agreed with Dr. Aguilar     This note and all of its recommendations herein are preliminary until co-signed by an attending physician

## 2023-10-12 NOTE — PROGRESS NOTE ADULT - SUBJECTIVE AND OBJECTIVE BOX
INTERVAL HPI/OVERNIGHT EVENTS:  Patient seen and examined at bedside.    Pt resting comfortably. No acute complaints.  States that he is still having left sided abdominal pain but it has improved since yesterday.   Denies N/V.   Denies fever, chills, sob, chest pain or any other constitutional complaints.     He states that the imagery that he has been seeing when he closes his eyes has improved.       MEDICATIONS  (STANDING):  cefepime   IVPB 1000 milliGRAM(s) IV Intermittent every 8 hours  cefepime   IVPB      dextrose 5% + sodium chloride 0.9%. 1000 milliLiter(s) (115 mL/Hr) IV Continuous <Continuous>  dextrose 5%. 1000 milliLiter(s) (100 mL/Hr) IV Continuous <Continuous>  dextrose 5%. 1000 milliLiter(s) (50 mL/Hr) IV Continuous <Continuous>  dextrose 50% Injectable 25 Gram(s) IV Push once  dextrose 50% Injectable 12.5 Gram(s) IV Push once  dextrose 50% Injectable 25 Gram(s) IV Push once  glucagon  Injectable 1 milliGRAM(s) IntraMuscular once  insulin lispro (ADMELOG) corrective regimen sliding scale   SubCutaneous every 6 hours  metroNIDAZOLE  IVPB 500 milliGRAM(s) IV Intermittent every 8 hours  potassium phosphate IVPB 30 milliMole(s) IV Intermittent once  sodium chloride 0.9%. 1000 milliLiter(s) (125 mL/Hr) IV Continuous <Continuous>    MEDICATIONS  (PRN):  dextrose Oral Gel 15 Gram(s) Oral once PRN Blood Glucose LESS THAN 70 milliGRAM(s)/deciliter  ketorolac   Injectable 15 milliGRAM(s) IV Push every 6 hours PRN Moderate Pain (4 - 6)      Vital Signs Last 24 Hrs  T(C): 36.8 (12 Oct 2023 05:28), Max: 37.1 (11 Oct 2023 20:15)  T(F): 98.2 (12 Oct 2023 05:28), Max: 98.8 (11 Oct 2023 20:15)  HR: 90 (12 Oct 2023 05:28) (90 - 97)  BP: 124/76 (12 Oct 2023 05:28) (124/76 - 132/81)  BP(mean): --  RR: 18 (12 Oct 2023 05:28) (18 - 18)  SpO2: 97% (12 Oct 2023 05:28) (95% - 97%)    Parameters below as of 12 Oct 2023 05:28  Patient On (Oxygen Delivery Method): room air    Physical:  General: A&Ox3. NAD.  Respiratory: non labored  Skin: warm and dry   Abdomen: Softly distended, tender to palpation in the LLQ   JAVIER x2 in place draining serosanguineous output   dressing clean, dry and intact,   : no arevalo in place, voiding   Extremities: non edematous, no calf pain bilaterally      I&O's Detail    11 Oct 2023 07:01  -  12 Oct 2023 07:00  --------------------------------------------------------  IN:  Total IN: 0 mL    OUT:    Bulb (mL): 45 mL    Bulb (mL): 75 mL  Total OUT: 120 mL    Total NET: -120 mL          LABS:                        11.4   8.93  )-----------( 239      ( 12 Oct 2023 05:20 )             33.5             10-12    139  |  108  |  11  ----------------------------<  131<H>  3.8   |  25  |  0.63    Ca    7.9<L>      12 Oct 2023 05:20  Phos  1.6     10-12  Mg     2.2     10-12

## 2023-10-12 NOTE — DISCHARGE NOTE PROVIDER - CARE PROVIDER_API CALL
Lorena Aguilar  Surgery  [address], [address]  [place] [address]  Phone: [phone]  Fax: [phone]  Follow Up Time:

## 2023-10-12 NOTE — DISCHARGE NOTE PROVIDER - HOSPITAL COURSE
52 y/o male with no past medical history and no past surgical history presented to the ED with one day of abdominal pain with associated nausea, vomiting, fever. CT demonstrated acute appendicitis with no free air or evidence of abscess. Pt was admitted to the surgical service, started on IV antibiotics, and scheduled for laparoscopic appendectomy. Pt underwent surgery on 10/9/2023. Appendix was noted to perforated and gangrenous intraoperatively. 2 JAVIER drains left in place. Postoperatively, pt remained stable with pain well controlled. JAVIER output remained minimal. At the time of discharge, the patient was hemodynamically stable, was tolerating PO diet, was voiding urine and passing flatus was ambulating, and was comfortable with adequate pain control. The patient was instructed to follow up with  within 1-2 weeks after discharge from the hospital. The patient/family felt comfortable with discharge. The patient is stable and ready for discharge to home. The patient had no other issues.

## 2023-10-12 NOTE — PROGRESS NOTE ADULT - ASSESSMENT
Acute Gangrenous Appendicitis - complicated by perforation  Peritonitis ( focal)  Fevers - resplved  Leukocytosis - normalized    Plan - Cont Maxipime 1gm iv q8hrs  Cont Flagyl 500mgs iv q8hrs  when stable to go home will switch to PO antibiotics to complete a total of 14 days of all antibiotics.

## 2023-10-12 NOTE — PROGRESS NOTE ADULT - MUSCULOSKELETAL
Nydia, it's Dr. Hirsch,    The results of the tests from the last visit are all normal.      Please message me for any questions.   negative no joint swelling/no joint erythema/no joint warmth

## 2023-10-12 NOTE — DISCHARGE NOTE PROVIDER - NSDCCPTREATMENT_GEN_ALL_CORE_FT
PRINCIPAL PROCEDURE  Procedure: Laparoscopic appendectomy  Findings and Treatment: Please follow-up with your surgeon in 1 week. Drink plenty of fluids and rest as needed. Call for any fever over 101, nausea, vomiting, severe pain, no passing of gas or bowel movement.   DIET  You may resume your regular diet as normal.   JAVIER Drains  Patient's may go home with JAVIER drain. If you are discharged with drains please record color and amount of output, bring to your postop appointment. If you are concerned by amount of output, dark red color, pain, etc. please call the office  SURGICAL SITES  Remove outer dressing and keep white steri-strips in place allowing them to fall off on their own. You may shower 48 hours post-operatively but do not bathe or soak in the water for 1-2 weeks; pat dry. If you notice any signs of surgical site infection (ie. redness, swelling, pain, pus drainage), please seek medical care immediately.   ACTIVITY  Do not lift anything heavier than 10 pounds for 2 weeks and avoid strenuous activity for 4-6 weeks.   PAIN CONTROL  You may take Motrin 600mg (with food) or Tylenol 650mg as needed for mild pain. Stagger one medication 3 hours after the other for maximum pain control. Maximum daily dose of Tylenol should not exceed 4grams/day.

## 2023-10-12 NOTE — PROGRESS NOTE ADULT - SUBJECTIVE AND OBJECTIVE BOX
53y Male    Meds:  cefepime   IVPB 1000 milliGRAM(s) IV Intermittent every 8 hours  cefepime   IVPB      metroNIDAZOLE  IVPB 500 milliGRAM(s) IV Intermittent every 8 hours    Allergies    No Known Allergies    Intolerances        VITALS:  Vital Signs Last 24 Hrs  T(C): 36.5 (12 Oct 2023 13:43), Max: 37.1 (11 Oct 2023 20:15)  T(F): 97.7 (12 Oct 2023 13:43), Max: 98.8 (11 Oct 2023 20:15)  HR: 72 (12 Oct 2023 13:43) (72 - 94)  BP: 102/63 (12 Oct 2023 13:43) (102/63 - 128/76)  BP(mean): --  RR: 18 (12 Oct 2023 13:43) (18 - 18)  SpO2: 95% (12 Oct 2023 13:43) (95% - 97%)    Parameters below as of 12 Oct 2023 13:43  Patient On (Oxygen Delivery Method): room air        LABS/DIAGNOSTIC TESTS:                          11.4   8.93  )-----------( 239      ( 12 Oct 2023 05:20 )             33.5         10-12    139  |  108  |  11  ----------------------------<  131<H>  3.8   |  25  |  0.63    Ca    7.9<L>      12 Oct 2023 05:20  Phos  1.6     10-12  Mg     2.2     10-12            CULTURES: Clean Catch Clean Catch (Midstream)  10-08 @ 22:50   No growth  --  --      .Blood Blood-Peripheral  10-08 @ 21:20   No growth at 72 Hours  --  --      .Blood Blood-Peripheral  10-08 @ 21:10   No growth at 72 Hours  --  --            RADIOLOGY:      ROS:  [  ] UNABLE TO ELICIT 53y Male who is doing better as far as his abdominal pain goes and so was switched to a soft diet and is tolerating it so far, he has no nausea , vomiting or diarrhea, no fevers or chills, no urinary symptoms, he still has some visual hallucinations but much less than yesterday.    Meds:  cefepime   IVPB 1000 milliGRAM(s) IV Intermittent every 8 hours  cefepime   IVPB      metroNIDAZOLE  IVPB 500 milliGRAM(s) IV Intermittent every 8 hours    Allergies    No Known Allergies    Intolerances        VITALS:  Vital Signs Last 24 Hrs  T(C): 36.5 (12 Oct 2023 13:43), Max: 37.1 (11 Oct 2023 20:15)  T(F): 97.7 (12 Oct 2023 13:43), Max: 98.8 (11 Oct 2023 20:15)  HR: 72 (12 Oct 2023 13:43) (72 - 94)  BP: 102/63 (12 Oct 2023 13:43) (102/63 - 128/76)  BP(mean): --  RR: 18 (12 Oct 2023 13:43) (18 - 18)  SpO2: 95% (12 Oct 2023 13:43) (95% - 97%)    Parameters below as of 12 Oct 2023 13:43  Patient On (Oxygen Delivery Method): room air        LABS/DIAGNOSTIC TESTS:                          11.4   8.93  )-----------( 239      ( 12 Oct 2023 05:20 )             33.5         10-12    139  |  108  |  11  ----------------------------<  131<H>  3.8   |  25  |  0.63    Ca    7.9<L>      12 Oct 2023 05:20  Phos  1.6     10-12  Mg     2.2     10-12            CULTURES: Clean Catch Clean Catch (Midstream)  10-08 @ 22:50   No growth  --  --      .Blood Blood-Peripheral  10-08 @ 21:20   No growth at 72 Hours  --  --      .Blood Blood-Peripheral  10-08 @ 21:10   No growth at 72 Hours  --  --            RADIOLOGY:      ROS:  [  ] UNABLE TO ELICIT

## 2023-10-12 NOTE — DISCHARGE NOTE PROVIDER - NSDCMRMEDTOKEN_GEN_ALL_CORE_FT
cefpodoxime 200 mg oral tablet: 1 tab(s) orally 2 times a day  metroNIDAZOLE 500 mg oral tablet: 1 tab(s) orally 3 times a day

## 2023-10-13 LAB
ANION GAP SERPL CALC-SCNC: 8 MMOL/L — SIGNIFICANT CHANGE UP (ref 5–17)
ANISOCYTOSIS BLD QL: SLIGHT — SIGNIFICANT CHANGE UP
BASOPHILS # BLD AUTO: 0 K/UL — SIGNIFICANT CHANGE UP (ref 0–0.2)
BASOPHILS NFR BLD AUTO: 0 % — SIGNIFICANT CHANGE UP (ref 0–2)
BUN SERPL-MCNC: 9 MG/DL — SIGNIFICANT CHANGE UP (ref 7–18)
CALCIUM SERPL-MCNC: 8 MG/DL — LOW (ref 8.4–10.5)
CHLORIDE SERPL-SCNC: 105 MMOL/L — SIGNIFICANT CHANGE UP (ref 96–108)
CO2 SERPL-SCNC: 26 MMOL/L — SIGNIFICANT CHANGE UP (ref 22–31)
CREAT SERPL-MCNC: 0.71 MG/DL — SIGNIFICANT CHANGE UP (ref 0.5–1.3)
EGFR: 110 ML/MIN/1.73M2 — SIGNIFICANT CHANGE UP
EOSINOPHIL # BLD AUTO: 0 K/UL — SIGNIFICANT CHANGE UP (ref 0–0.5)
EOSINOPHIL NFR BLD AUTO: 0 % — SIGNIFICANT CHANGE UP (ref 0–6)
GIANT PLATELETS BLD QL SMEAR: PRESENT — SIGNIFICANT CHANGE UP
GLUCOSE BLDC GLUCOMTR-MCNC: 109 MG/DL — HIGH (ref 70–99)
GLUCOSE BLDC GLUCOMTR-MCNC: 119 MG/DL — HIGH (ref 70–99)
GLUCOSE BLDC GLUCOMTR-MCNC: 121 MG/DL — HIGH (ref 70–99)
GLUCOSE BLDC GLUCOMTR-MCNC: 123 MG/DL — HIGH (ref 70–99)
GLUCOSE SERPL-MCNC: 133 MG/DL — HIGH (ref 70–99)
HCT VFR BLD CALC: 34.1 % — LOW (ref 39–50)
HGB BLD-MCNC: 11.7 G/DL — LOW (ref 13–17)
HYPOSEGMENTATION: PRESENT — SIGNIFICANT CHANGE UP
LYMPHOCYTES # BLD AUTO: 1.24 K/UL — SIGNIFICANT CHANGE UP (ref 1–3.3)
LYMPHOCYTES # BLD AUTO: 10 % — LOW (ref 13–44)
MAGNESIUM SERPL-MCNC: 2.1 MG/DL — SIGNIFICANT CHANGE UP (ref 1.6–2.6)
MANUAL SMEAR VERIFICATION: SIGNIFICANT CHANGE UP
MCHC RBC-ENTMCNC: 30.1 PG — SIGNIFICANT CHANGE UP (ref 27–34)
MCHC RBC-ENTMCNC: 34.3 GM/DL — SIGNIFICANT CHANGE UP (ref 32–36)
MCV RBC AUTO: 87.7 FL — SIGNIFICANT CHANGE UP (ref 80–100)
MICROCYTES BLD QL: SLIGHT — SIGNIFICANT CHANGE UP
MONOCYTES # BLD AUTO: 0.87 K/UL — SIGNIFICANT CHANGE UP (ref 0–0.9)
MONOCYTES NFR BLD AUTO: 7 % — SIGNIFICANT CHANGE UP (ref 2–14)
MYELOCYTES NFR BLD: 6 % — HIGH (ref 0–0)
NEUTROPHILS # BLD AUTO: 9.44 K/UL — HIGH (ref 1.8–7.4)
NEUTROPHILS NFR BLD AUTO: 74 % — SIGNIFICANT CHANGE UP (ref 43–77)
NEUTS BAND # BLD: 2 % — SIGNIFICANT CHANGE UP (ref 0–8)
NRBC # BLD: 0 /100 — SIGNIFICANT CHANGE UP (ref 0–0)
PHOSPHATE SERPL-MCNC: 2.8 MG/DL — SIGNIFICANT CHANGE UP (ref 2.5–4.5)
PLAT MORPH BLD: NORMAL — SIGNIFICANT CHANGE UP
PLATELET # BLD AUTO: 275 K/UL — SIGNIFICANT CHANGE UP (ref 150–400)
PLATELET COUNT - ESTIMATE: NORMAL — SIGNIFICANT CHANGE UP
POIKILOCYTOSIS BLD QL AUTO: SLIGHT — SIGNIFICANT CHANGE UP
POLYCHROMASIA BLD QL SMEAR: SLIGHT — SIGNIFICANT CHANGE UP
POTASSIUM SERPL-MCNC: 3 MMOL/L — LOW (ref 3.5–5.3)
POTASSIUM SERPL-SCNC: 3 MMOL/L — LOW (ref 3.5–5.3)
RBC # BLD: 3.89 M/UL — LOW (ref 4.2–5.8)
RBC # FLD: 13.6 % — SIGNIFICANT CHANGE UP (ref 10.3–14.5)
RBC BLD AUTO: ABNORMAL
SMUDGE CELLS # BLD: PRESENT — SIGNIFICANT CHANGE UP
SODIUM SERPL-SCNC: 139 MMOL/L — SIGNIFICANT CHANGE UP (ref 135–145)
VARIANT LYMPHS # BLD: 1 % — SIGNIFICANT CHANGE UP (ref 0–6)
WBC # BLD: 12.42 K/UL — HIGH (ref 3.8–10.5)
WBC # FLD AUTO: 12.42 K/UL — HIGH (ref 3.8–10.5)

## 2023-10-13 RX ORDER — INSULIN LISPRO 100/ML
VIAL (ML) SUBCUTANEOUS
Refills: 0 | Status: DISCONTINUED | OUTPATIENT
Start: 2023-10-13 | End: 2023-10-15

## 2023-10-13 RX ORDER — POTASSIUM CHLORIDE 20 MEQ
10 PACKET (EA) ORAL
Refills: 0 | Status: COMPLETED | OUTPATIENT
Start: 2023-10-13 | End: 2023-10-13

## 2023-10-13 RX ORDER — SENNA PLUS 8.6 MG/1
2 TABLET ORAL AT BEDTIME
Refills: 0 | Status: DISCONTINUED | OUTPATIENT
Start: 2023-10-13 | End: 2023-10-13

## 2023-10-13 RX ORDER — POTASSIUM CHLORIDE 20 MEQ
40 PACKET (EA) ORAL ONCE
Refills: 0 | Status: COMPLETED | OUTPATIENT
Start: 2023-10-13 | End: 2023-10-13

## 2023-10-13 RX ADMIN — Medication 100 MILLIGRAM(S): at 22:43

## 2023-10-13 RX ADMIN — CEFEPIME 100 MILLIGRAM(S): 1 INJECTION, POWDER, FOR SOLUTION INTRAMUSCULAR; INTRAVENOUS at 21:56

## 2023-10-13 RX ADMIN — Medication 100 MILLIEQUIVALENT(S): at 11:02

## 2023-10-13 RX ADMIN — CEFEPIME 100 MILLIGRAM(S): 1 INJECTION, POWDER, FOR SOLUTION INTRAMUSCULAR; INTRAVENOUS at 05:52

## 2023-10-13 RX ADMIN — Medication 100 MILLIGRAM(S): at 13:17

## 2023-10-13 RX ADMIN — Medication 100 MILLIEQUIVALENT(S): at 08:46

## 2023-10-13 RX ADMIN — CEFEPIME 100 MILLIGRAM(S): 1 INJECTION, POWDER, FOR SOLUTION INTRAMUSCULAR; INTRAVENOUS at 13:17

## 2023-10-13 RX ADMIN — Medication 100 MILLIGRAM(S): at 06:35

## 2023-10-13 RX ADMIN — HEPARIN SODIUM 5000 UNIT(S): 5000 INJECTION INTRAVENOUS; SUBCUTANEOUS at 13:17

## 2023-10-13 RX ADMIN — HEPARIN SODIUM 5000 UNIT(S): 5000 INJECTION INTRAVENOUS; SUBCUTANEOUS at 22:07

## 2023-10-13 RX ADMIN — Medication 100 MILLIEQUIVALENT(S): at 13:13

## 2023-10-13 RX ADMIN — Medication 40 MILLIEQUIVALENT(S): at 08:46

## 2023-10-13 RX ADMIN — HEPARIN SODIUM 5000 UNIT(S): 5000 INJECTION INTRAVENOUS; SUBCUTANEOUS at 05:52

## 2023-10-13 NOTE — PROGRESS NOTE ADULT - SUBJECTIVE AND OBJECTIVE BOX
INTERVAL HPI/OVERNIGHT EVENTS: NAEO. AVSS. Patient seen and examined at bedside. No acute complaints. Denies nausea, emesis, fever, chills. Tolerating diet. Reports BM this AM    Vital Signs Last 24 Hrs  T(C): 37 (13 Oct 2023 05:32), Max: 37.2 (12 Oct 2023 20:37)  T(F): 98.6 (13 Oct 2023 05:32), Max: 99 (12 Oct 2023 20:37)  HR: 88 (13 Oct 2023 05:32) (72 - 98)  BP: 121/74 (13 Oct 2023 05:32) (102/63 - 135/81)  BP(mean): 90 (13 Oct 2023 05:32) (90 - 90)  RR: 18 (13 Oct 2023 05:32) (18 - 18)  SpO2: 97% (13 Oct 2023 05:32) (95% - 97%)    Parameters below as of 13 Oct 2023 05:32  Patient On (Oxygen Delivery Method): room air      I&O's Detail    12 Oct 2023 07:01  -  13 Oct 2023 07:00  --------------------------------------------------------  IN:  Total IN: 0 mL    OUT:    Bulb (mL): 35 mL    Bulb (mL): 30 mL  Total OUT: 65 mL    Total NET: -65 mL        cefepime   IVPB      cefepime   IVPB 1000 milliGRAM(s) IV Intermittent every 8 hours  metroNIDAZOLE  IVPB 500 milliGRAM(s) IV Intermittent every 8 hours      Physical Exam:  General: AAOx3, No acute distress  HEENT: NC/AT, trachea midline  Respiratory: Nonlabored breathing, equal chest rise b/l   Skin: No jaundice, no icterus  Abdomen: softly distended, nontender to palpation. Dressing c/d/i. JAVIER x2 with ss output  Extremities: non edematous, no calf pain bilaterally  Lines/Drains/Tubes:     Drains/Tubes:     10-12-23 @ 07:01  -  10-13-23 @ 07:00  --------------------------------------------------------  IN: 0 mL / OUT: 65 mL / NET: -65 mL        Labs:                        11.7   12.42 )-----------( 275      ( 13 Oct 2023 06:18 )             34.1     10-13    139  |  105  |  9   ----------------------------<  133<H>  3.0<L>   |  26  |  0.71    Ca    8.0<L>      13 Oct 2023 06:18  Phos  2.8     10-13  Mg     2.1     10-13          RADIOLOGY & ADDITIONAL STUDIES:

## 2023-10-13 NOTE — PROGRESS NOTE ADULT - ASSESSMENT
Acute Gangrenous Appendicitis - complicated by perforation  Peritonitis ( focal)  Fevers - resolved   Leukocytosis - slightly higher    Plan - Cont Maxipime 1gm iv q8hrs  Cont Flagyl 500mgs iv q8hrs  when stable to go home will switch to PO antibiotics to complete a total of 14 days of all antibiotics.

## 2023-10-13 NOTE — PROGRESS NOTE ADULT - ASSESSMENT
53y.o. Male s/p gangrenous lap appendectomy POD #4  afebrile, WBC 12.9    PLAN  - continue diet as tolerated  - replete electrolytes PRN  - iv abx, appreciate ID recs  - dvt ppx, oob, ambulate as tolerated  - pain meds, antiemetic, antipyretic prn   - monitor I/Os   - d/c planning

## 2023-10-13 NOTE — PROGRESS NOTE ADULT - SUBJECTIVE AND OBJECTIVE BOX
53y Male    Meds:  cefepime   IVPB      cefepime   IVPB 1000 milliGRAM(s) IV Intermittent every 8 hours  metroNIDAZOLE  IVPB 500 milliGRAM(s) IV Intermittent every 8 hours    Allergies    No Known Allergies    Intolerances        VITALS:  Vital Signs Last 24 Hrs  T(C): 37.2 (13 Oct 2023 14:00), Max: 37.2 (12 Oct 2023 20:37)  T(F): 98.9 (13 Oct 2023 14:00), Max: 99 (12 Oct 2023 20:37)  HR: 91 (13 Oct 2023 14:00) (88 - 98)  BP: 128/84 (13 Oct 2023 14:00) (121/74 - 135/81)  BP(mean): 90 (13 Oct 2023 05:32) (90 - 90)  RR: 18 (13 Oct 2023 14:00) (18 - 18)  SpO2: 98% (13 Oct 2023 14:00) (97% - 98%)    Parameters below as of 13 Oct 2023 14:00  Patient On (Oxygen Delivery Method): room air        LABS/DIAGNOSTIC TESTS:                          11.7   12.42 )-----------( 275      ( 13 Oct 2023 06:18 )             34.1         10-13    139  |  105  |  9   ----------------------------<  133<H>  3.0<L>   |  26  |  0.71    Ca    8.0<L>      13 Oct 2023 06:18  Phos  2.8     10-13  Mg     2.1     10-13            CULTURES: Clean Catch Clean Catch (Midstream)  10-08 @ 22:50   No growth  --  --      .Blood Blood-Peripheral  10-08 @ 21:20   No growth at 4 days  --  --      .Blood Blood-Peripheral  10-08 @ 21:10   No growth at 4 days  --  --            RADIOLOGY:      ROS:  [  ] UNABLE TO ELICIT 53y Male who is doing well , he has minimal abdominal pain, he has no fevers or chills, no diarrhea , he still see some images when he closes his eyes but less, he has no confusion or other complaints. His WBC count increased a little bit.    Meds:  cefepime   IVPB      cefepime   IVPB 1000 milliGRAM(s) IV Intermittent every 8 hours  metroNIDAZOLE  IVPB 500 milliGRAM(s) IV Intermittent every 8 hours    Allergies    No Known Allergies    Intolerances        VITALS:  Vital Signs Last 24 Hrs  T(C): 37.2 (13 Oct 2023 14:00), Max: 37.2 (12 Oct 2023 20:37)  T(F): 98.9 (13 Oct 2023 14:00), Max: 99 (12 Oct 2023 20:37)  HR: 91 (13 Oct 2023 14:00) (88 - 98)  BP: 128/84 (13 Oct 2023 14:00) (121/74 - 135/81)  BP(mean): 90 (13 Oct 2023 05:32) (90 - 90)  RR: 18 (13 Oct 2023 14:00) (18 - 18)  SpO2: 98% (13 Oct 2023 14:00) (97% - 98%)    Parameters below as of 13 Oct 2023 14:00  Patient On (Oxygen Delivery Method): room air        LABS/DIAGNOSTIC TESTS:                          11.7   12.42 )-----------( 275      ( 13 Oct 2023 06:18 )             34.1         10-13    139  |  105  |  9   ----------------------------<  133<H>  3.0<L>   |  26  |  0.71    Ca    8.0<L>      13 Oct 2023 06:18  Phos  2.8     10-13  Mg     2.1     10-13            CULTURES: Clean Catch Clean Catch (Midstream)  10-08 @ 22:50   No growth  --  --      .Blood Blood-Peripheral  10-08 @ 21:20   No growth at 4 days  --  --      .Blood Blood-Peripheral  10-08 @ 21:10   No growth at 4 days  --  --            RADIOLOGY:      ROS:  [  ] UNABLE TO ELICIT

## 2023-10-14 LAB
ANION GAP SERPL CALC-SCNC: 5 MMOL/L — SIGNIFICANT CHANGE UP (ref 5–17)
APPEARANCE UR: CLEAR — SIGNIFICANT CHANGE UP
BILIRUB UR-MCNC: NEGATIVE — SIGNIFICANT CHANGE UP
BUN SERPL-MCNC: 10 MG/DL — SIGNIFICANT CHANGE UP (ref 7–18)
CALCIUM SERPL-MCNC: 8 MG/DL — LOW (ref 8.4–10.5)
CHLORIDE SERPL-SCNC: 104 MMOL/L — SIGNIFICANT CHANGE UP (ref 96–108)
CO2 SERPL-SCNC: 28 MMOL/L — SIGNIFICANT CHANGE UP (ref 22–31)
COLOR SPEC: YELLOW — SIGNIFICANT CHANGE UP
CREAT SERPL-MCNC: 0.72 MG/DL — SIGNIFICANT CHANGE UP (ref 0.5–1.3)
CULTURE RESULTS: SIGNIFICANT CHANGE UP
CULTURE RESULTS: SIGNIFICANT CHANGE UP
DIFF PNL FLD: NEGATIVE — SIGNIFICANT CHANGE UP
EGFR: 109 ML/MIN/1.73M2 — SIGNIFICANT CHANGE UP
GLUCOSE BLDC GLUCOMTR-MCNC: 104 MG/DL — HIGH (ref 70–99)
GLUCOSE BLDC GLUCOMTR-MCNC: 107 MG/DL — HIGH (ref 70–99)
GLUCOSE BLDC GLUCOMTR-MCNC: 107 MG/DL — HIGH (ref 70–99)
GLUCOSE BLDC GLUCOMTR-MCNC: 108 MG/DL — HIGH (ref 70–99)
GLUCOSE SERPL-MCNC: 128 MG/DL — HIGH (ref 70–99)
GLUCOSE UR QL: NEGATIVE MG/DL — SIGNIFICANT CHANGE UP
HCT VFR BLD CALC: 36 % — LOW (ref 39–50)
HGB BLD-MCNC: 12.3 G/DL — LOW (ref 13–17)
KETONES UR-MCNC: NEGATIVE MG/DL — SIGNIFICANT CHANGE UP
LEUKOCYTE ESTERASE UR-ACNC: NEGATIVE — SIGNIFICANT CHANGE UP
MAGNESIUM SERPL-MCNC: 2.2 MG/DL — SIGNIFICANT CHANGE UP (ref 1.6–2.6)
MCHC RBC-ENTMCNC: 30.4 PG — SIGNIFICANT CHANGE UP (ref 27–34)
MCHC RBC-ENTMCNC: 34.2 GM/DL — SIGNIFICANT CHANGE UP (ref 32–36)
MCV RBC AUTO: 88.9 FL — SIGNIFICANT CHANGE UP (ref 80–100)
NITRITE UR-MCNC: NEGATIVE — SIGNIFICANT CHANGE UP
NRBC # BLD: 0 /100 WBCS — SIGNIFICANT CHANGE UP (ref 0–0)
PH UR: 7 — SIGNIFICANT CHANGE UP (ref 5–8)
PHOSPHATE SERPL-MCNC: 2.4 MG/DL — LOW (ref 2.5–4.5)
PLATELET # BLD AUTO: 330 K/UL — SIGNIFICANT CHANGE UP (ref 150–400)
POTASSIUM SERPL-MCNC: 4.3 MMOL/L — SIGNIFICANT CHANGE UP (ref 3.5–5.3)
POTASSIUM SERPL-SCNC: 4.3 MMOL/L — SIGNIFICANT CHANGE UP (ref 3.5–5.3)
PROT UR-MCNC: NEGATIVE MG/DL — SIGNIFICANT CHANGE UP
RBC # BLD: 4.05 M/UL — LOW (ref 4.2–5.8)
RBC # FLD: 13.6 % — SIGNIFICANT CHANGE UP (ref 10.3–14.5)
SODIUM SERPL-SCNC: 137 MMOL/L — SIGNIFICANT CHANGE UP (ref 135–145)
SP GR SPEC: 1.05 — HIGH (ref 1–1.03)
SPECIMEN SOURCE: SIGNIFICANT CHANGE UP
SPECIMEN SOURCE: SIGNIFICANT CHANGE UP
UROBILINOGEN FLD QL: 0.2 MG/DL — SIGNIFICANT CHANGE UP (ref 0.2–1)
WBC # BLD: 12.54 K/UL — HIGH (ref 3.8–10.5)
WBC # FLD AUTO: 12.54 K/UL — HIGH (ref 3.8–10.5)

## 2023-10-14 PROCEDURE — 74177 CT ABD & PELVIS W/CONTRAST: CPT | Mod: 26

## 2023-10-14 RX ORDER — POTASSIUM PHOSPHATE, MONOBASIC POTASSIUM PHOSPHATE, DIBASIC 236; 224 MG/ML; MG/ML
15 INJECTION, SOLUTION INTRAVENOUS ONCE
Refills: 0 | Status: DISCONTINUED | OUTPATIENT
Start: 2023-10-14 | End: 2023-10-14

## 2023-10-14 RX ADMIN — Medication 100 MILLIGRAM(S): at 15:19

## 2023-10-14 RX ADMIN — Medication 100 MILLIGRAM(S): at 21:19

## 2023-10-14 RX ADMIN — CEFEPIME 100 MILLIGRAM(S): 1 INJECTION, POWDER, FOR SOLUTION INTRAMUSCULAR; INTRAVENOUS at 13:08

## 2023-10-14 RX ADMIN — CEFEPIME 100 MILLIGRAM(S): 1 INJECTION, POWDER, FOR SOLUTION INTRAMUSCULAR; INTRAVENOUS at 05:00

## 2023-10-14 RX ADMIN — HEPARIN SODIUM 5000 UNIT(S): 5000 INJECTION INTRAVENOUS; SUBCUTANEOUS at 05:00

## 2023-10-14 RX ADMIN — HEPARIN SODIUM 5000 UNIT(S): 5000 INJECTION INTRAVENOUS; SUBCUTANEOUS at 15:19

## 2023-10-14 RX ADMIN — CEFEPIME 100 MILLIGRAM(S): 1 INJECTION, POWDER, FOR SOLUTION INTRAMUSCULAR; INTRAVENOUS at 21:19

## 2023-10-14 RX ADMIN — HEPARIN SODIUM 5000 UNIT(S): 5000 INJECTION INTRAVENOUS; SUBCUTANEOUS at 21:19

## 2023-10-14 RX ADMIN — Medication 100 MILLIGRAM(S): at 06:20

## 2023-10-14 NOTE — PROGRESS NOTE ADULT - GASTROINTESTINAL
details… soft/nontender/nondistended/normal active bowel sounds/no guarding/no rigidity/no masses palpable

## 2023-10-14 NOTE — PROGRESS NOTE ADULT - SUBJECTIVE AND OBJECTIVE BOX
INTERVAL HPI/OVERNIGHT EVENTS:  Pt resting comfortably in bed. No acute complaints. Tolerating minced/moist diet. No abdominal complaints. Passing flatus and bowel movements. Denies fever, chills, nausea, vomiting.     MEDICATIONS  (STANDING):  bisacodyl 5 milliGRAM(s) Oral every 12 hours  cefepime   IVPB      cefepime   IVPB 1000 milliGRAM(s) IV Intermittent every 8 hours  dextrose 5% + sodium chloride 0.9%. 1000 milliLiter(s) (115 mL/Hr) IV Continuous <Continuous>  dextrose 5%. 1000 milliLiter(s) (100 mL/Hr) IV Continuous <Continuous>  dextrose 5%. 1000 milliLiter(s) (50 mL/Hr) IV Continuous <Continuous>  dextrose 50% Injectable 12.5 Gram(s) IV Push once  dextrose 50% Injectable 25 Gram(s) IV Push once  dextrose 50% Injectable 25 Gram(s) IV Push once  glucagon  Injectable 1 milliGRAM(s) IntraMuscular once  heparin   Injectable 5000 Unit(s) SubCutaneous every 8 hours  insulin lispro (ADMELOG) corrective regimen sliding scale   SubCutaneous Before meals and at bedtime  metroNIDAZOLE  IVPB 500 milliGRAM(s) IV Intermittent every 8 hours  sodium chloride 0.9%. 1000 milliLiter(s) (125 mL/Hr) IV Continuous <Continuous>    MEDICATIONS  (PRN):  dextrose Oral Gel 15 Gram(s) Oral once PRN Blood Glucose LESS THAN 70 milliGRAM(s)/deciliter  ketorolac   Injectable 15 milliGRAM(s) IV Push every 6 hours PRN Moderate Pain (4 - 6)      Vital Signs Last 24 Hrs  T(C): 37.4 (14 Oct 2023 05:39), Max: 37.4 (14 Oct 2023 05:39)  T(F): 99.3 (14 Oct 2023 05:39), Max: 99.3 (14 Oct 2023 05:39)  HR: 83 (14 Oct 2023 05:39) (83 - 91)  BP: 124/76 (14 Oct 2023 05:39) (124/76 - 133/82)  BP(mean): 92 (14 Oct 2023 05:39) (92 - 92)  RR: 18 (14 Oct 2023 05:39) (18 - 18)  SpO2: 96% (14 Oct 2023 05:39) (96% - 98%)    Parameters below as of 14 Oct 2023 05:39  Patient On (Oxygen Delivery Method): room air        Physical:  General: A&Ox3. NAD.  Resp: unlabored breathing. Equal chest rise bilaterally.  Abdomen: Soft nondistended, diffusely nontender to palpation. JAVIER drains in place. Minimal SS output. No voluntary guarding, no rebound tenderness.       I&O's Detail    13 Oct 2023 07:01  -  14 Oct 2023 07:00  --------------------------------------------------------  IN:  Total IN: 0 mL    OUT:    Bulb (mL): 25 mL    Bulb (mL): 25 mL  Total OUT: 50 mL    Total NET: -50 mL          LABS:                        12.3   12.54 )-----------( 330      ( 14 Oct 2023 06:00 )             36.0             10-14    137  |  104  |  10  ----------------------------<  128<H>  4.3   |  28  |  0.72    Ca    8.0<L>      14 Oct 2023 06:00  Phos  2.4     10-14  Mg     2.2     10-14        53y.o. Male

## 2023-10-14 NOTE — PROGRESS NOTE ADULT - NS ATTEND AMEND GEN_ALL_CORE FT
Pt seen and examined. Abd- Soft, less distended. Non tender. + bowel function drains serosanguinous.  WBC still slightly elevated. CT completed. Shows RLQ fluid collection.  consult IR. Cont IV abx

## 2023-10-14 NOTE — PROGRESS NOTE ADULT - ASSESSMENT
Intraabdominal collection - I doubt that this is an abscess given his lack of symptoms such as abdominal pain, fever or a WBC count that should be significantly higher for such a large collection. I feel that this is likely a seroma and have discussed this with Dr Aguilar as well and we are both in agreement.    Acute Gangrenous Appendicitis - complicated by perforation  Peritonitis ( focal)  Fevers - resolved   Leukocytosis - slightly higher    Plan - Cont Maxipime 1gm iv q8hrs  Cont Flagyl 500mgs iv q8hrs  Can DC home tomorrow on Vantin 400mgs  ( 2 tablets of 200mgs each) po BID and   Flagyl 500mgs po TID both for 14 days more.   PLEASE CALL HIS PHARMACY TO CONFIRM VANTIN IS COVERED.  He will need a follow up CT abdomen / pelvis in 3-4 weeks to look for resolution of collection.

## 2023-10-14 NOTE — PROGRESS NOTE ADULT - SUBJECTIVE AND OBJECTIVE BOX
53y Male    Meds:  cefepime   IVPB      cefepime   IVPB 1000 milliGRAM(s) IV Intermittent every 8 hours  metroNIDAZOLE  IVPB 500 milliGRAM(s) IV Intermittent every 8 hours    Allergies    No Known Allergies    Intolerances        VITALS:  Vital Signs Last 24 Hrs  T(C): 36.8 (14 Oct 2023 14:15), Max: 37.4 (14 Oct 2023 05:39)  T(F): 98.2 (14 Oct 2023 14:15), Max: 99.3 (14 Oct 2023 05:39)  HR: 93 (14 Oct 2023 14:15) (83 - 93)  BP: 126/81 (14 Oct 2023 14:15) (124/76 - 133/82)  BP(mean): 92 (14 Oct 2023 05:39) (92 - 92)  RR: 16 (14 Oct 2023 14:15) (16 - 18)  SpO2: 99% (14 Oct 2023 14:15) (96% - 99%)    Parameters below as of 14 Oct 2023 14:15  Patient On (Oxygen Delivery Method): room air        LABS/DIAGNOSTIC TESTS:                          12.3   12.54 )-----------( 330      ( 14 Oct 2023 06:00 )             36.0         10-14    137  |  104  |  10  ----------------------------<  128<H>  4.3   |  28  |  0.72    Ca    8.0<L>      14 Oct 2023 06:00  Phos  2.4     10-14  Mg     2.2     10-14            CULTURES: Clean Catch Clean Catch (Midstream)  10-08 @ 22:50   No growth  --  --      .Blood Blood-Peripheral  10-08 @ 21:20   No growth at 5 days  --  --      .Blood Blood-Peripheral  10-08 @ 21:10   No growth at 5 days  --  --            RADIOLOGY:< from: CT Abdomen and Pelvis w/ IV Cont (10.14.23 @ 12:07) >  ACC: [id number] EXAM:  CT ABDOMEN AND PELVIS IC   ORDERED BY: LATRELL ESPINOSA     PROCEDURE DATE:  10/14/2023          INTERPRETATION:  CLINICAL INFORMATION: 53 years  Male with r/o abscess.   Status post perforated gangrenous appendicitis with laparoscopic   appendectomy    COMPARISON: 10/8/2023    CONTRAST/COMPLICATIONS:  IV Contrast: Omnipaque 350  90 cc administered   10 cc discarded  Oral Contrast: NONE  Complications: None reported at time of study completion    PROCEDURE:  CT of the Abdomen and Pelvis was performed.  Sagittal and coronal reformats were performed.    FINDINGS:  LOWER CHEST: Mild left lower lobe discoid atelectasis    LIVER: Redemonstrated 1.4 cm right lobe hypodensity. Scattered   subcentimeter hypodensities too small to characterize.  BILE DUCTS: Normal caliber.  GALLBLADDER: Within normal limits.  SPLEEN: Within normal limits.  PANCREAS: Within normal limits.  ADRENALS: Within normal limits.  KIDNEYS/URETERS: No hydroureteronephrosis or calculi. Symmetrical   nephrograms. Mild right perinephric fat stranding. Right mid renal   hypodensities too small to characterize.    BLADDER: Droplet of intraluminal air.  REPRODUCTIVE ORGANS: Prostate within normal limits.    BOWEL: No bowel obstruction. Appendix surgically absent.  PERITONEUM: No ascites. No pneumoperitoneum. 2.2 x 3.5 x 8.2 cm right   lower quadrant mesenteric fluid collection (2:102 and 4:57). 2 drainage   catheter tips in the left lower quadrant.  VESSELS: Within normal limits.  RETROPERITONEUM/LYMPH NODES: No lymphadenopathy.  ABDOMINAL WALL: Drainage catheter via a low midline approach with tip in   the left lower quadrant (2:111). Right mid abdominal drainage catheter   with tip in the left pelvis (2:107).  BONES: Within normal limits.    IMPRESSION:  2.2 x 3.5 x 8.2 cm right lower quadrant mesenteric fluid collection   (2:102 and 4:57), either postoperative seroma, hematoma or abscess. The   drainage catheters are not within the collection.    Right perinephric edema concerning for pyelonephritis.    Air in the bladder either iatrogenic, infection or fistula.    1.4 cm hepatic hypodensity. Recommend MRI for characterization.          --- End of Report ---            AURORA EDGAR MD; Attending Radiologist  This document has been electronically signed. Oct 14 2023 12:18PM    < end of copied text >        ROS:  [  ] UNABLE TO ELICIT 53y Male who is clinically doing extremely well , he has no abdominal pain , no nausea, vomiting or diarrhea. He has no fevers or chills , his WBC count is still a little elevated today and so was sent for repeat CT abdomen and Pelvis and was found to have a large collection in the RLQ which could be a seroma/ abscess or hematoma.     Meds:  cefepime   IVPB      cefepime   IVPB 1000 milliGRAM(s) IV Intermittent every 8 hours  metroNIDAZOLE  IVPB 500 milliGRAM(s) IV Intermittent every 8 hours    Allergies    No Known Allergies    Intolerances        VITALS:  Vital Signs Last 24 Hrs  T(C): 36.8 (14 Oct 2023 14:15), Max: 37.4 (14 Oct 2023 05:39)  T(F): 98.2 (14 Oct 2023 14:15), Max: 99.3 (14 Oct 2023 05:39)  HR: 93 (14 Oct 2023 14:15) (83 - 93)  BP: 126/81 (14 Oct 2023 14:15) (124/76 - 133/82)  BP(mean): 92 (14 Oct 2023 05:39) (92 - 92)  RR: 16 (14 Oct 2023 14:15) (16 - 18)  SpO2: 99% (14 Oct 2023 14:15) (96% - 99%)    Parameters below as of 14 Oct 2023 14:15  Patient On (Oxygen Delivery Method): room air        LABS/DIAGNOSTIC TESTS:                          12.3   12.54 )-----------( 330      ( 14 Oct 2023 06:00 )             36.0         10-14    137  |  104  |  10  ----------------------------<  128<H>  4.3   |  28  |  0.72    Ca    8.0<L>      14 Oct 2023 06:00  Phos  2.4     10-14  Mg     2.2     10-14            CULTURES: Clean Catch Clean Catch (Midstream)  10-08 @ 22:50   No growth  --  --      .Blood Blood-Peripheral  10-08 @ 21:20   No growth at 5 days  --  --      .Blood Blood-Peripheral  10-08 @ 21:10   No growth at 5 days  --  --            RADIOLOGY:< from: CT Abdomen and Pelvis w/ IV Cont (10.14.23 @ 12:07) >  ACC: [id number] EXAM:  CT ABDOMEN AND PELVIS IC   ORDERED BY: LATRELL ESPINOSA     PROCEDURE DATE:  10/14/2023          INTERPRETATION:  CLINICAL INFORMATION: 53 years  Male with r/o abscess.   Status post perforated gangrenous appendicitis with laparoscopic   appendectomy    COMPARISON: 10/8/2023    CONTRAST/COMPLICATIONS:  IV Contrast: Omnipaque 350  90 cc administered   10 cc discarded  Oral Contrast: NONE  Complications: None reported at time of study completion    PROCEDURE:  CT of the Abdomen and Pelvis was performed.  Sagittal and coronal reformats were performed.    FINDINGS:  LOWER CHEST: Mild left lower lobe discoid atelectasis    LIVER: Redemonstrated 1.4 cm right lobe hypodensity. Scattered   subcentimeter hypodensities too small to characterize.  BILE DUCTS: Normal caliber.  GALLBLADDER: Within normal limits.  SPLEEN: Within normal limits.  PANCREAS: Within normal limits.  ADRENALS: Within normal limits.  KIDNEYS/URETERS: No hydroureteronephrosis or calculi. Symmetrical   nephrograms. Mild right perinephric fat stranding. Right mid renal   hypodensities too small to characterize.    BLADDER: Droplet of intraluminal air.  REPRODUCTIVE ORGANS: Prostate within normal limits.    BOWEL: No bowel obstruction. Appendix surgically absent.  PERITONEUM: No ascites. No pneumoperitoneum. 2.2 x 3.5 x 8.2 cm right   lower quadrant mesenteric fluid collection (2:102 and 4:57). 2 drainage   catheter tips in the left lower quadrant.  VESSELS: Within normal limits.  RETROPERITONEUM/LYMPH NODES: No lymphadenopathy.  ABDOMINAL WALL: Drainage catheter via a low midline approach with tip in   the left lower quadrant (2:111). Right mid abdominal drainage catheter   with tip in the left pelvis (2:107).  BONES: Within normal limits.    IMPRESSION:  2.2 x 3.5 x 8.2 cm right lower quadrant mesenteric fluid collection   (2:102 and 4:57), either postoperative seroma, hematoma or abscess. The   drainage catheters are not within the collection.    Right perinephric edema concerning for pyelonephritis.    Air in the bladder either iatrogenic, infection or fistula.    1.4 cm hepatic hypodensity. Recommend MRI for characterization.          --- End of Report ---            AURORA EDGAR MD; Attending Radiologist  This document has been electronically signed. Oct 14 2023 12:18PM    < end of copied text >        ROS:  [  ] UNABLE TO ELICIT

## 2023-10-15 ENCOUNTER — TRANSCRIPTION ENCOUNTER (OUTPATIENT)
Age: 53
End: 2023-10-15

## 2023-10-15 VITALS
TEMPERATURE: 98 F | HEART RATE: 88 BPM | DIASTOLIC BLOOD PRESSURE: 73 MMHG | RESPIRATION RATE: 18 BRPM | SYSTOLIC BLOOD PRESSURE: 117 MMHG | OXYGEN SATURATION: 99 %

## 2023-10-15 LAB
ALBUMIN SERPL ELPH-MCNC: 2.9 G/DL — LOW (ref 3.5–5)
ALP SERPL-CCNC: 95 U/L — SIGNIFICANT CHANGE UP (ref 40–120)
ALT FLD-CCNC: 96 U/L DA — HIGH (ref 10–60)
ANION GAP SERPL CALC-SCNC: 11 MMOL/L — SIGNIFICANT CHANGE UP (ref 5–17)
AST SERPL-CCNC: 74 U/L — HIGH (ref 10–40)
BILIRUB DIRECT SERPL-MCNC: 0.2 MG/DL — SIGNIFICANT CHANGE UP (ref 0–0.3)
BILIRUB INDIRECT FLD-MCNC: 0.7 MG/DL — SIGNIFICANT CHANGE UP (ref 0.2–1)
BILIRUB SERPL-MCNC: 0.9 MG/DL — SIGNIFICANT CHANGE UP (ref 0.2–1.2)
BUN SERPL-MCNC: 13 MG/DL — SIGNIFICANT CHANGE UP (ref 7–18)
CALCIUM SERPL-MCNC: 8 MG/DL — LOW (ref 8.4–10.5)
CHLORIDE SERPL-SCNC: 101 MMOL/L — SIGNIFICANT CHANGE UP (ref 96–108)
CO2 SERPL-SCNC: 24 MMOL/L — SIGNIFICANT CHANGE UP (ref 22–31)
CREAT SERPL-MCNC: 0.67 MG/DL — SIGNIFICANT CHANGE UP (ref 0.5–1.3)
CULTURE RESULTS: NO GROWTH — SIGNIFICANT CHANGE UP
EGFR: 112 ML/MIN/1.73M2 — SIGNIFICANT CHANGE UP
GLUCOSE BLDC GLUCOMTR-MCNC: 110 MG/DL — HIGH (ref 70–99)
GLUCOSE BLDC GLUCOMTR-MCNC: 118 MG/DL — HIGH (ref 70–99)
GLUCOSE SERPL-MCNC: 117 MG/DL — HIGH (ref 70–99)
HCT VFR BLD CALC: 37.3 % — LOW (ref 39–50)
HGB BLD-MCNC: 12.7 G/DL — LOW (ref 13–17)
MAGNESIUM SERPL-MCNC: 2.3 MG/DL — SIGNIFICANT CHANGE UP (ref 1.6–2.6)
MCHC RBC-ENTMCNC: 30.2 PG — SIGNIFICANT CHANGE UP (ref 27–34)
MCHC RBC-ENTMCNC: 34 GM/DL — SIGNIFICANT CHANGE UP (ref 32–36)
MCV RBC AUTO: 88.8 FL — SIGNIFICANT CHANGE UP (ref 80–100)
NRBC # BLD: 0 /100 WBCS — SIGNIFICANT CHANGE UP (ref 0–0)
PHOSPHATE SERPL-MCNC: 2.7 MG/DL — SIGNIFICANT CHANGE UP (ref 2.5–4.5)
PLATELET # BLD AUTO: 378 K/UL — SIGNIFICANT CHANGE UP (ref 150–400)
POTASSIUM SERPL-MCNC: 3.5 MMOL/L — SIGNIFICANT CHANGE UP (ref 3.5–5.3)
POTASSIUM SERPL-SCNC: 3.5 MMOL/L — SIGNIFICANT CHANGE UP (ref 3.5–5.3)
PROT SERPL-MCNC: 7 G/DL — SIGNIFICANT CHANGE UP (ref 6–8.3)
RBC # BLD: 4.2 M/UL — SIGNIFICANT CHANGE UP (ref 4.2–5.8)
RBC # FLD: 13.6 % — SIGNIFICANT CHANGE UP (ref 10.3–14.5)
SODIUM SERPL-SCNC: 136 MMOL/L — SIGNIFICANT CHANGE UP (ref 135–145)
SPECIMEN SOURCE: SIGNIFICANT CHANGE UP
WBC # BLD: 11.58 K/UL — HIGH (ref 3.8–10.5)
WBC # FLD AUTO: 11.58 K/UL — HIGH (ref 3.8–10.5)

## 2023-10-15 PROCEDURE — 84100 ASSAY OF PHOSPHORUS: CPT

## 2023-10-15 PROCEDURE — 87040 BLOOD CULTURE FOR BACTERIA: CPT

## 2023-10-15 PROCEDURE — 86850 RBC ANTIBODY SCREEN: CPT

## 2023-10-15 PROCEDURE — C1889: CPT

## 2023-10-15 PROCEDURE — 96375 TX/PRO/DX INJ NEW DRUG ADDON: CPT

## 2023-10-15 PROCEDURE — 88304 TISSUE EXAM BY PATHOLOGIST: CPT

## 2023-10-15 PROCEDURE — 81003 URINALYSIS AUTO W/O SCOPE: CPT

## 2023-10-15 PROCEDURE — 85610 PROTHROMBIN TIME: CPT

## 2023-10-15 PROCEDURE — 85027 COMPLETE CBC AUTOMATED: CPT

## 2023-10-15 PROCEDURE — 83605 ASSAY OF LACTIC ACID: CPT

## 2023-10-15 PROCEDURE — 87635 SARS-COV-2 COVID-19 AMP PRB: CPT

## 2023-10-15 PROCEDURE — 93005 ELECTROCARDIOGRAM TRACING: CPT

## 2023-10-15 PROCEDURE — 85730 THROMBOPLASTIN TIME PARTIAL: CPT

## 2023-10-15 PROCEDURE — 81001 URINALYSIS AUTO W/SCOPE: CPT

## 2023-10-15 PROCEDURE — 84484 ASSAY OF TROPONIN QUANT: CPT

## 2023-10-15 PROCEDURE — 36415 COLL VENOUS BLD VENIPUNCTURE: CPT

## 2023-10-15 PROCEDURE — 86900 BLOOD TYPING SEROLOGIC ABO: CPT

## 2023-10-15 PROCEDURE — 83690 ASSAY OF LIPASE: CPT

## 2023-10-15 PROCEDURE — 80048 BASIC METABOLIC PNL TOTAL CA: CPT

## 2023-10-15 PROCEDURE — 86901 BLOOD TYPING SEROLOGIC RH(D): CPT

## 2023-10-15 PROCEDURE — 85025 COMPLETE CBC W/AUTO DIFF WBC: CPT

## 2023-10-15 PROCEDURE — 99285 EMERGENCY DEPT VISIT HI MDM: CPT

## 2023-10-15 PROCEDURE — 87086 URINE CULTURE/COLONY COUNT: CPT

## 2023-10-15 PROCEDURE — 82962 GLUCOSE BLOOD TEST: CPT

## 2023-10-15 PROCEDURE — 80076 HEPATIC FUNCTION PANEL: CPT

## 2023-10-15 PROCEDURE — 80053 COMPREHEN METABOLIC PANEL: CPT

## 2023-10-15 PROCEDURE — 71045 X-RAY EXAM CHEST 1 VIEW: CPT

## 2023-10-15 PROCEDURE — 82248 BILIRUBIN DIRECT: CPT

## 2023-10-15 PROCEDURE — 74177 CT ABD & PELVIS W/CONTRAST: CPT | Mod: MA

## 2023-10-15 PROCEDURE — 83735 ASSAY OF MAGNESIUM: CPT

## 2023-10-15 PROCEDURE — 96374 THER/PROPH/DIAG INJ IV PUSH: CPT

## 2023-10-15 PROCEDURE — 83036 HEMOGLOBIN GLYCOSYLATED A1C: CPT

## 2023-10-15 RX ORDER — CEFPODOXIME PROXETIL 100 MG
1 TABLET ORAL
Qty: 28 | Refills: 0
Start: 2023-10-15 | End: 2023-10-28

## 2023-10-15 RX ORDER — METRONIDAZOLE 500 MG
1 TABLET ORAL
Qty: 42 | Refills: 0
Start: 2023-10-15 | End: 2023-10-28

## 2023-10-15 RX ADMIN — CEFEPIME 100 MILLIGRAM(S): 1 INJECTION, POWDER, FOR SOLUTION INTRAMUSCULAR; INTRAVENOUS at 05:05

## 2023-10-15 RX ADMIN — Medication 100 MILLIGRAM(S): at 05:05

## 2023-10-15 RX ADMIN — HEPARIN SODIUM 5000 UNIT(S): 5000 INJECTION INTRAVENOUS; SUBCUTANEOUS at 05:05

## 2023-10-15 NOTE — PROGRESS NOTE ADULT - PROVIDER SPECIALTY LIST ADULT
Infectious Disease
Surgery
Surgery
Infectious Disease
Surgery
Infectious Disease
Surgery
Infectious Disease

## 2023-10-15 NOTE — PROGRESS NOTE ADULT - NSPROGADDITIONALINFOA_GEN_ALL_CORE
PT seen and examined. Denies any abdominal pain. CT images reviewed. RLQ fluid likely seroma,  Abd- soft, NT, ND no guarding no rebound. drains serosanguinous slightly murky  UA negative.  Stable for discharge to home on po ABX. Leave drains

## 2023-10-15 NOTE — PROGRESS NOTE ADULT - SUBJECTIVE AND OBJECTIVE BOX
53y Male who is doing well clinically, he has no abdominal pain , no nausea, vomiting or diarrhea, he is covered for Vantin and so will be going home today but with both his JAVIER drains.     Meds:  cefepime   IVPB 1000 milliGRAM(s) IV Intermittent every 8 hours  cefepime   IVPB      metroNIDAZOLE  IVPB 500 milliGRAM(s) IV Intermittent every 8 hours    Allergies    No Known Allergies    Intolerances        VITALS:  Vital Signs Last 24 Hrs  T(C): 36.8 (15 Oct 2023 13:42), Max: 37.7 (14 Oct 2023 20:48)  T(F): 98.2 (15 Oct 2023 13:42), Max: 99.8 (14 Oct 2023 20:48)  HR: 88 (15 Oct 2023 13:42) (83 - 88)  BP: 117/73 (15 Oct 2023 13:42) (114/77 - 122/72)  BP(mean): 89 (14 Oct 2023 20:48) (89 - 89)  RR: 18 (15 Oct 2023 13:42) (16 - 18)  SpO2: 99% (15 Oct 2023 13:42) (97% - 99%)    Parameters below as of 15 Oct 2023 13:42  Patient On (Oxygen Delivery Method): room air        LABS/DIAGNOSTIC TESTS:                          12.7   11.58 )-----------( 378      ( 15 Oct 2023 05:33 )             37.3         10-15    136  |  101  |  13  ----------------------------<  117<H>  3.5   |  24  |  0.67    Ca    8.0<L>      15 Oct 2023 05:33  Phos  2.7     10-15  Mg     2.3     10-15    TPro  7.0  /  Alb  2.9<L>  /  TBili  0.9  /  DBili  0.2  /  AST  74<H>  /  ALT  96<H>  /  AlkPhos  95  10-15      LIVER FUNCTIONS - ( 15 Oct 2023 05:33 )  Alb: 2.9 g/dL / Pro: 7.0 g/dL / ALK PHOS: 95 U/L / ALT: 96 U/L DA / AST: 74 U/L / GGT: x             CULTURES: Clean Catch Clean Catch (Midstream)  10-08 @ 22:50   No growth  --  --      .Blood Blood-Peripheral  10-08 @ 21:20   No growth at 5 days  --  --      .Blood Blood-Peripheral  10-08 @ 21:10   No growth at 5 days  --  --            RADIOLOGY:      ROS:  [  ] UNABLE TO ELICIT

## 2023-10-15 NOTE — PROGRESS NOTE ADULT - ASSESSMENT
53y.o. Male s/p gangrenous lap appendectomy POD #3  afebrile, leukocytosis 11.58 (12.54), low phos 2.4  UA wnl     - continue soft diet  - iv abx, f/u ID recs for d/c   - d/c planning   - dvt ppx, oob, ambulate as tolerated  - pain meds, antiemetic, antipyretic prn   - discussed and agreed with Dr. Aguilar

## 2023-10-15 NOTE — PROGRESS NOTE ADULT - SUBJECTIVE AND OBJECTIVE BOX
INTERVAL HPI/OVERNIGHT EVENTS:  Patient seen and examined at bedside.    Pt resting comfortably. No acute complaints.   Tolerating diet.   Patient denies any N/V. States that is his abdominal pain has improved.   Patient denies any vision changes or imagery.   Patient denies chest pain, shortness of breath, fever, chills or any other constitutional complaints.       MEDICATIONS  (STANDING):  bisacodyl 5 milliGRAM(s) Oral every 12 hours  cefepime   IVPB      cefepime   IVPB 1000 milliGRAM(s) IV Intermittent every 8 hours  dextrose 5% + sodium chloride 0.9%. 1000 milliLiter(s) (115 mL/Hr) IV Continuous <Continuous>  dextrose 5%. 1000 milliLiter(s) (100 mL/Hr) IV Continuous <Continuous>  dextrose 5%. 1000 milliLiter(s) (50 mL/Hr) IV Continuous <Continuous>  dextrose 50% Injectable 12.5 Gram(s) IV Push once  dextrose 50% Injectable 25 Gram(s) IV Push once  dextrose 50% Injectable 25 Gram(s) IV Push once  glucagon  Injectable 1 milliGRAM(s) IntraMuscular once  heparin   Injectable 5000 Unit(s) SubCutaneous every 8 hours  insulin lispro (ADMELOG) corrective regimen sliding scale   SubCutaneous Before meals and at bedtime  metroNIDAZOLE  IVPB 500 milliGRAM(s) IV Intermittent every 8 hours  sodium chloride 0.9%. 1000 milliLiter(s) (125 mL/Hr) IV Continuous <Continuous>    MEDICATIONS  (PRN):  dextrose Oral Gel 15 Gram(s) Oral once PRN Blood Glucose LESS THAN 70 milliGRAM(s)/deciliter      Vital Signs Last 24 Hrs  T(C): 36.8 (15 Oct 2023 05:18), Max: 37.7 (14 Oct 2023 20:48)  T(F): 98.2 (15 Oct 2023 05:18), Max: 99.8 (14 Oct 2023 20:48)  HR: 83 (15 Oct 2023 05:18) (83 - 93)  BP: 114/77 (15 Oct 2023 05:18) (114/77 - 126/81)  BP(mean): 89 (14 Oct 2023 20:48) (89 - 89)  RR: 17 (15 Oct 2023 05:18) (16 - 17)  SpO2: 97% (15 Oct 2023 05:18) (97% - 99%)    Parameters below as of 15 Oct 2023 05:18  Patient On (Oxygen Delivery Method): room air        Physical:  General: A&Ox3. NAD.  Respiratory: non labored  Skin: warm and dry   Abdomen: Soft, nondistended, nontender  : no arevalo in place, voiding   Extremities: non edematous, no calf pain bilaterally    I&O's Detail    14 Oct 2023 07:01  -  15 Oct 2023 07:00  --------------------------------------------------------  IN:  Total IN: 0 mL    OUT:    Bulb (mL): 10 mL    Bulb (mL): 17 mL  Total OUT: 27 mL    Total NET: -27 mL          LABS:                        12.7   11.58 )-----------( 378      ( 15 Oct 2023 05:33 )             37.3             10-15    136  |  101  |  13  ----------------------------<  117<H>  3.5   |  24  |  0.67    Ca    8.0<L>      15 Oct 2023 05:33  Phos  2.7     10-15  Mg     2.3     10-15    TPro  7.0  /  Alb  2.9<L>  /  TBili  0.9  /  DBili  0.2  /  AST  74<H>  /  ALT  96<H>  /  AlkPhos  95  10-15        < from: CT Abdomen and Pelvis w/ IV Cont (10.14.23 @ 12:07) >    FINDINGS:  LOWER CHEST: Mild left lower lobe discoid atelectasis    LIVER: Redemonstrated 1.4 cm right lobe hypodensity. Scattered   subcentimeter hypodensities too small to characterize.  BILE DUCTS: Normal caliber.  GALLBLADDER: Within normal limits.  SPLEEN: Within normal limits.  PANCREAS: Within normal limits.  ADRENALS: Within normal limits.  KIDNEYS/URETERS: No hydroureteronephrosis or calculi. Symmetrical   nephrograms. Mild right perinephric fat stranding. Right mid renal   hypodensities too small to characterize.    BLADDER: Droplet of intraluminal air.  REPRODUCTIVE ORGANS: Prostate within normal limits.    BOWEL: No bowel obstruction. Appendix surgically absent.  PERITONEUM: No ascites. No pneumoperitoneum. 2.2 x 3.5 x 8.2 cm right   lower quadrant mesenteric fluid collection (2:102 and 4:57). 2 drainage   catheter tips in the left lower quadrant.  VESSELS: Within normal limits.  RETROPERITONEUM/LYMPH NODES: No lymphadenopathy.  ABDOMINAL WALL: Drainage catheter via a low midline approach with tip in   the left lower quadrant (2:111). Right mid abdominal drainage catheter   with tip in the left pelvis (2:107).  BONES: Within normal limits.    IMPRESSION:  2.2 x 3.5 x 8.2 cm right lower quadrant mesenteric fluid collection   (2:102 and 4:57), either postoperative seroma, hematoma or abscess. The   drainage catheters are not within the collection.    Right perinephric edema concerning for pyelonephritis.    Air in the bladder either iatrogenic, infection or fistula.    1.4 cm hepatic hypodensity. Recommend MRI for characterization.    < end of copied text >

## 2023-10-15 NOTE — DISCHARGE NOTE NURSING/CASE MANAGEMENT/SOCIAL WORK - NSTRANSFERBELONGINGSDISPO_GEN_A_NUR
given to security
Initiate Treatment: Altreno 0.05% lotion QHS (mother has at home)\\n\\nclindamycin 1 % topical gel \\nQuantity: 60.0 g  Days Supply: 30\\nSig: Apply to back QAM
Render In Strict Bullet Format?: No
Detail Level: Zone
Initiate Treatment: ketoconazole 2 % shampoo QD\\nQuantity: 120.0 ml  Days Supply: 30\\nSig: Lather in scalp leave on for 5 min, then wash out daily.

## 2023-10-15 NOTE — PROGRESS NOTE ADULT - REASON FOR ADMISSION
acute appendicitis

## 2023-10-15 NOTE — DISCHARGE NOTE NURSING/CASE MANAGEMENT/SOCIAL WORK - PATIENT PORTAL LINK FT
You can access the FollowMyHealth Patient Portal offered by [facility] by registering at the following website: [email or web address]. By joining [email or web address]’s FollowMyHealth portal, you will also be able to view your health information using other applications (apps) compatible with our system.

## 2023-10-15 NOTE — PROGRESS NOTE ADULT - GASTROINTESTINAL COMMENTS
JAVIER drains still in place
2 JAVIER drains in place
Oral drains in place
2 JAVIRE drains still in place with serous drainage
2 JAVIER drains in place

## 2023-10-15 NOTE — PROGRESS NOTE ADULT - ASSESSMENT
Intraabdominal collection - likely a seroma  Acute Gangrenous Appendicitis - complicated by perforation  Peritonitis ( focal)  Fevers - resolved   Leukocytosis - slightly lower    Plan - Can DC home today on Vantin 400mgs  ( 2 tablets of 200mgs each) po BID and   Flagyl 500mgs po TID both for 14 days more.   He will need a follow up CT abdomen / pelvis in 3-4 weeks to look for resolution of collection.

## 2023-10-15 NOTE — PROGRESS NOTE ADULT - CONSTITUTIONAL
Pt alert to self only. VSS on RA. Up A1, gb walker. Voiding in urinal, incontinent at times. CMS and neuros intact. Dressing to hip CDI. Denies pain. Discharge plans pending. Continue to monitor.   
no distress

## 2023-10-15 NOTE — PROGRESS NOTE ADULT - TENDERNESS
minimal to no tenderness/LLQ/RLQ
LLQ
diffuse
mild/LLQ/RLQ
tenderness over JAVIER drain insertion site

## 2023-10-15 NOTE — PROGRESS NOTE ADULT - NEGATIVE GASTROINTESTINAL SYMPTOMS
no nausea/no vomiting/no diarrhea/no abdominal pain
no nausea/no vomiting/no diarrhea/no abdominal pain
no nausea/no vomiting/no diarrhea
no nausea/no vomiting/no diarrhea/no flatulence

## 2023-10-16 PROBLEM — Z00.00 ENCOUNTER FOR PREVENTIVE HEALTH EXAMINATION: Status: ACTIVE | Noted: 2023-10-16

## 2023-10-16 PROBLEM — Z78.9 OTHER SPECIFIED HEALTH STATUS: Chronic | Status: ACTIVE | Noted: 2023-10-09

## 2023-10-20 ENCOUNTER — APPOINTMENT (OUTPATIENT)
Age: 53
End: 2023-10-20
Payer: COMMERCIAL

## 2023-10-20 VITALS
HEIGHT: 64.96 IN | HEART RATE: 88 BPM | DIASTOLIC BLOOD PRESSURE: 72 MMHG | WEIGHT: 143 LBS | SYSTOLIC BLOOD PRESSURE: 112 MMHG | OXYGEN SATURATION: 99 % | BODY MASS INDEX: 23.82 KG/M2

## 2023-10-20 DIAGNOSIS — Z78.9 OTHER SPECIFIED HEALTH STATUS: ICD-10-CM

## 2023-10-20 PROCEDURE — 99024 POSTOP FOLLOW-UP VISIT: CPT

## 2023-11-03 ENCOUNTER — APPOINTMENT (OUTPATIENT)
Dept: SURGERY | Facility: CLINIC | Age: 53
End: 2023-11-03
Payer: COMMERCIAL

## 2023-11-03 VITALS
HEART RATE: 83 BPM | HEIGHT: 65 IN | TEMPERATURE: 97.7 F | SYSTOLIC BLOOD PRESSURE: 111 MMHG | DIASTOLIC BLOOD PRESSURE: 70 MMHG | OXYGEN SATURATION: 99 % | BODY MASS INDEX: 24.16 KG/M2 | WEIGHT: 145 LBS

## 2023-11-03 DIAGNOSIS — Z90.49 ACQUIRED ABSENCE OF OTHER SPECIFIED PARTS OF DIGESTIVE TRACT: ICD-10-CM

## 2023-11-03 PROCEDURE — 99024 POSTOP FOLLOW-UP VISIT: CPT

## (undated) DEVICE — ENDOCATCH 10MM SPECIMEN POUCH

## (undated) DEVICE — LIGASURE MARYLAND 37CM

## (undated) DEVICE — TUBING STRYKEFLOW II SUCTION / IRRIGATOR

## (undated) DEVICE — NDL HYPO REGULAR BEVEL 25G X 1.5" (BLUE)

## (undated) DEVICE — SOL IRR POUR NS 0.9% 1500ML

## (undated) DEVICE — D HELP - CLEARVIEW CLEARIFY SYSTEM

## (undated) DEVICE — SOL IRR BAG NS 0.9% 1000ML

## (undated) DEVICE — TROCAR ETHICON ENDOPATH XCEL BLADELESS 12MM X 100MM STABILITY

## (undated) DEVICE — WARMING BLANKET UPPER ADULT

## (undated) DEVICE — SYR LUER LOK 10CC

## (undated) DEVICE — SYR LUER LOK 20CC

## (undated) DEVICE — DRSG 2X2

## (undated) DEVICE — FOR-ESU VALLEYLAB T6D04554DX: Type: DURABLE MEDICAL EQUIPMENT

## (undated) DEVICE — GLV 8 PROTEXIS (BLUE)

## (undated) DEVICE — DRSG MASTISOL

## (undated) DEVICE — SUT VICRYL PLUS 0 27" UR-6

## (undated) DEVICE — TROCAR ETHICON ENDOPATH XCEL HASSON BLUNT TIP 12MM X 100MM STABILITY

## (undated) DEVICE — NDL HYPO SAFE 22G X 1.5" (BLACK)

## (undated) DEVICE — SUT MONOCRYL 4-0 27" PS-2 UNDYED

## (undated) DEVICE — VENODYNE/SCD SLEEVE CALF MEDIUM

## (undated) DEVICE — STAPLER COVIDIEN ENDO GIA STANDARD HANDLE

## (undated) DEVICE — PACK GENERAL LAPAROSCOPY

## (undated) DEVICE — SUT BIOSYN 4-0 18" P-12

## (undated) DEVICE — Device

## (undated) DEVICE — GLV 6.5 PROTEXIS (BLUE)

## (undated) DEVICE — DRAIN RESERVOIR FOR JACKSON PRATT 100CC CARDINAL

## (undated) DEVICE — LIGASURE MARYLAND 44CM

## (undated) DEVICE — DRSG STERISTRIPS 0.5 X 4"

## (undated) DEVICE — TROCAR ETHICON ENDOPATH XCEL BLADELESS 5MM X 100MM STABILITY

## (undated) DEVICE — GLV 6.5 PROTEXIS (WHITE)

## (undated) DEVICE — ELCTR GROUNDING PAD ADULT COVIDIEN

## (undated) DEVICE — TROCAR COVIDIEN VERSAPORT BLADELESS OPTICAL 5MM STANDARD

## (undated) DEVICE — SUT NYLON 2-0 18" FS

## (undated) DEVICE — GLV 8 PROTEXIS (WHITE)

## (undated) DEVICE — DRSG TRACH DRAINAGE 4X4

## (undated) DEVICE — NDL HYPO SAFE 18G X 1.5" (PINK)

## (undated) DEVICE — SUT POLYSORB 0 30" GU-46

## (undated) DEVICE — TUBING STRYKER PNEUMOSURE HEATED RTP